# Patient Record
Sex: FEMALE | Race: WHITE | Employment: FULL TIME | URBAN - METROPOLITAN AREA
[De-identification: names, ages, dates, MRNs, and addresses within clinical notes are randomized per-mention and may not be internally consistent; named-entity substitution may affect disease eponyms.]

---

## 2017-08-31 ENCOUNTER — OFFICE VISIT (OUTPATIENT)
Dept: FAMILY MEDICINE CLINIC | Age: 52
End: 2017-08-31

## 2017-08-31 VITALS
HEART RATE: 79 BPM | RESPIRATION RATE: 22 BRPM | BODY MASS INDEX: 35.12 KG/M2 | DIASTOLIC BLOOD PRESSURE: 62 MMHG | SYSTOLIC BLOOD PRESSURE: 102 MMHG | OXYGEN SATURATION: 98 % | WEIGHT: 186 LBS | TEMPERATURE: 98.5 F | HEIGHT: 61 IN

## 2017-08-31 DIAGNOSIS — I67.1 BRAIN ANEURYSM: ICD-10-CM

## 2017-08-31 DIAGNOSIS — Z00.00 ROUTINE ADULT HEALTH MAINTENANCE: ICD-10-CM

## 2017-08-31 DIAGNOSIS — Z86.19 HISTORY OF HEPATITIS C: ICD-10-CM

## 2017-08-31 DIAGNOSIS — R53.83 FATIGUE, UNSPECIFIED TYPE: ICD-10-CM

## 2017-08-31 DIAGNOSIS — R06.02 SHORTNESS OF BREATH: Primary | ICD-10-CM

## 2017-08-31 PROBLEM — J44.9 COPD (CHRONIC OBSTRUCTIVE PULMONARY DISEASE) (HCC): Status: ACTIVE | Noted: 2017-08-31

## 2017-08-31 LAB
A/G RATIO: 1.3 (ref 1.1–2.2)
ALBUMIN SERPL-MCNC: 4.1 G/DL (ref 3.4–5)
ALP BLD-CCNC: 81 U/L (ref 40–129)
ALT SERPL-CCNC: 21 U/L (ref 10–40)
ANION GAP SERPL CALCULATED.3IONS-SCNC: 15 MMOL/L (ref 3–16)
AST SERPL-CCNC: 23 U/L (ref 15–37)
BASOPHILS ABSOLUTE: 0.1 K/UL (ref 0–0.2)
BASOPHILS RELATIVE PERCENT: 0.9 %
BILIRUB SERPL-MCNC: 0.4 MG/DL (ref 0–1)
BUN BLDV-MCNC: 10 MG/DL (ref 7–20)
CALCIUM SERPL-MCNC: 9.4 MG/DL (ref 8.3–10.6)
CHLORIDE BLD-SCNC: 106 MMOL/L (ref 99–110)
CO2: 24 MMOL/L (ref 21–32)
CREAT SERPL-MCNC: 0.8 MG/DL (ref 0.6–1.1)
EOSINOPHILS ABSOLUTE: 0.3 K/UL (ref 0–0.6)
EOSINOPHILS RELATIVE PERCENT: 4.2 %
GFR AFRICAN AMERICAN: >60
GFR NON-AFRICAN AMERICAN: >60
GLOBULIN: 3.1 G/DL
GLUCOSE BLD-MCNC: 94 MG/DL (ref 70–99)
HCT VFR BLD CALC: 42 % (ref 36–48)
HEMOGLOBIN: 14.2 G/DL (ref 12–16)
LYMPHOCYTES ABSOLUTE: 2.1 K/UL (ref 1–5.1)
LYMPHOCYTES RELATIVE PERCENT: 28.5 %
MCH RBC QN AUTO: 31.6 PG (ref 26–34)
MCHC RBC AUTO-ENTMCNC: 33.8 G/DL (ref 31–36)
MCV RBC AUTO: 93.5 FL (ref 80–100)
MONOCYTES ABSOLUTE: 0.7 K/UL (ref 0–1.3)
MONOCYTES RELATIVE PERCENT: 8.9 %
NEUTROPHILS ABSOLUTE: 4.2 K/UL (ref 1.7–7.7)
NEUTROPHILS RELATIVE PERCENT: 57.5 %
PDW BLD-RTO: 14.4 % (ref 12.4–15.4)
PLATELET # BLD: 220 K/UL (ref 135–450)
PMV BLD AUTO: 10 FL (ref 5–10.5)
POTASSIUM SERPL-SCNC: 4.4 MMOL/L (ref 3.5–5.1)
RBC # BLD: 4.5 M/UL (ref 4–5.2)
SODIUM BLD-SCNC: 145 MMOL/L (ref 136–145)
TOTAL PROTEIN: 7.2 G/DL (ref 6.4–8.2)
TSH SERPL DL<=0.05 MIU/L-ACNC: 0.8 UIU/ML (ref 0.27–4.2)
VITAMIN D 25-HYDROXY: 22.6 NG/ML
WBC # BLD: 7.4 K/UL (ref 4–11)

## 2017-08-31 PROCEDURE — 99204 OFFICE O/P NEW MOD 45 MIN: CPT | Performed by: FAMILY MEDICINE

## 2017-08-31 RX ORDER — FLUTICASONE FUROATE AND VILANTEROL 200; 25 UG/1; UG/1
1 POWDER RESPIRATORY (INHALATION) DAILY
Qty: 28 EACH | Refills: 3 | Status: SHIPPED | OUTPATIENT
Start: 2017-08-31 | End: 2018-04-13 | Stop reason: ALTCHOICE

## 2017-08-31 ASSESSMENT — ENCOUNTER SYMPTOMS
COUGH: 0
DIARRHEA: 0
NAUSEA: 0
CONSTIPATION: 0
RHINORRHEA: 0
SHORTNESS OF BREATH: 0

## 2017-08-31 ASSESSMENT — PATIENT HEALTH QUESTIONNAIRE - PHQ9
SUM OF ALL RESPONSES TO PHQ QUESTIONS 1-9: 0
2. FEELING DOWN, DEPRESSED OR HOPELESS: 0
1. LITTLE INTEREST OR PLEASURE IN DOING THINGS: 0
SUM OF ALL RESPONSES TO PHQ9 QUESTIONS 1 & 2: 0

## 2017-09-01 LAB
ESTIMATED AVERAGE GLUCOSE: 99.7 MG/DL
HBA1C MFR BLD: 5.1 %
HEPATITIS C ANTIBODY INTERPRETATION: REACTIVE

## 2017-09-03 LAB
EER HCV RNA QNT PCR: NORMAL
HCV RNA QNT REAL-TIME PCR INTERP: NOT DETECTED
HCV RNA, QUANTITATIVE REAL TIME PCR: <1.2 LOG IU
HEPATITIS C RNA PCR QUANT: <15 IU/ML

## 2018-01-09 ENCOUNTER — OFFICE VISIT (OUTPATIENT)
Dept: FAMILY MEDICINE CLINIC | Age: 53
End: 2018-01-09

## 2018-01-09 VITALS
TEMPERATURE: 98.2 F | DIASTOLIC BLOOD PRESSURE: 70 MMHG | HEART RATE: 70 BPM | WEIGHT: 186 LBS | RESPIRATION RATE: 14 BRPM | BODY MASS INDEX: 35.14 KG/M2 | OXYGEN SATURATION: 98 % | SYSTOLIC BLOOD PRESSURE: 112 MMHG

## 2018-01-09 DIAGNOSIS — J06.9 VIRAL URI WITH COUGH: Primary | ICD-10-CM

## 2018-01-09 PROCEDURE — 99213 OFFICE O/P EST LOW 20 MIN: CPT | Performed by: NURSE PRACTITIONER

## 2018-01-09 PROCEDURE — 3017F COLORECTAL CA SCREEN DOC REV: CPT | Performed by: NURSE PRACTITIONER

## 2018-01-09 PROCEDURE — G8417 CALC BMI ABV UP PARAM F/U: HCPCS | Performed by: NURSE PRACTITIONER

## 2018-01-09 PROCEDURE — G8427 DOCREV CUR MEDS BY ELIG CLIN: HCPCS | Performed by: NURSE PRACTITIONER

## 2018-01-09 PROCEDURE — 1036F TOBACCO NON-USER: CPT | Performed by: NURSE PRACTITIONER

## 2018-01-09 PROCEDURE — 3014F SCREEN MAMMO DOC REV: CPT | Performed by: NURSE PRACTITIONER

## 2018-01-09 PROCEDURE — G8484 FLU IMMUNIZE NO ADMIN: HCPCS | Performed by: NURSE PRACTITIONER

## 2018-01-09 RX ORDER — DEXTROMETHORPHAN HYDROBROMIDE AND PROMETHAZINE HYDROCHLORIDE 15; 6.25 MG/5ML; MG/5ML
5 SYRUP ORAL 4 TIMES DAILY PRN
Qty: 140 ML | Refills: 0 | Status: SHIPPED | OUTPATIENT
Start: 2018-01-09 | End: 2018-01-16

## 2018-01-09 RX ORDER — FLUTICASONE PROPIONATE 50 MCG
2 SPRAY, SUSPENSION (ML) NASAL DAILY
Qty: 1 BOTTLE | Refills: 0 | Status: SHIPPED | OUTPATIENT
Start: 2018-01-09

## 2018-01-09 ASSESSMENT — ENCOUNTER SYMPTOMS
RHINORRHEA: 0
COUGH: 1
CHEST TIGHTNESS: 1
SHORTNESS OF BREATH: 1
WHEEZING: 1
VOMITING: 0
DIARRHEA: 0
NAUSEA: 0
SINUS PRESSURE: 1
SORE THROAT: 0
SINUS PAIN: 0

## 2018-01-09 NOTE — PROGRESS NOTES
1/9/2018    This is a 45 y.o. female   Chief Complaint   Patient presents with    URI     x4 days, bodyaches, SOB, cough, headache, fever    . URI    This is a new problem. Episode onset: 4 days. The problem has been unchanged. The maximum temperature recorded prior to her arrival was 101 - 101.9 F. The fever has been present for 1 to 2 days. Associated symptoms include congestion, coughing and wheezing. Pertinent negatives include no chest pain, diarrhea, headaches, nausea, rhinorrhea, sinus pain, sore throat or vomiting. Treatments tried: afrin, tylenol. The treatment provided mild relief. Patient Active Problem List   Diagnosis    Brain aneurysm - rupture 20 yr ago    History of hepatitis C - tx w interferon, cure    COPD (chronic obstructive pulmonary disease) (HCC)    Routine adult health maintenance       Current Outpatient Prescriptions   Medication Sig Dispense Refill    Fluticasone Furoate-Vilanterol 200-25 MCG/INH AEPB Inhale 1 puff into the lungs daily 28 each 3     No current facility-administered medications for this visit. Allergies   Allergen Reactions    Aspirin Itching       Review of Systems   Constitutional: Positive for activity change, fatigue and fever. Temp on Sat and Sun was 101. HENT: Positive for congestion and sinus pressure. Negative for postnasal drip, rhinorrhea, sinus pain and sore throat. Respiratory: Positive for cough, chest tightness, shortness of breath and wheezing. Cardiovascular: Negative for chest pain. Gastrointestinal: Negative for diarrhea, nausea and vomiting. Neurological: Positive for dizziness. Negative for headaches. Vitals:    01/09/18 1534   BP: 112/70   Site: Right Arm   Position: Sitting   Cuff Size: Medium Adult   Pulse: 70   Resp: 14   Temp: 98.2 °F (36.8 °C)   TempSrc: Oral   SpO2: 98%   Weight: 186 lb (84.4 kg)       Body mass index is 35.14 kg/m².      Wt Readings from Last 3 Encounters:   01/09/18 186 lb (84.4 kg)   08/31/17 186 lb (84.4 kg)   05/18/10 166 lb (75.3 kg)       BP Readings from Last 3 Encounters:   01/09/18 112/70   08/31/17 102/62   05/18/10 117/76       Physical Exam   Constitutional: She is oriented to person, place, and time. She appears well-developed and well-nourished. No distress. HENT:   Head: Normocephalic and atraumatic. Right Ear: Tympanic membrane, external ear and ear canal normal. Tympanic membrane is not erythematous and not bulging. Left Ear: Tympanic membrane, external ear and ear canal normal. Tympanic membrane is not erythematous and not bulging. Nose: Right sinus exhibits maxillary sinus tenderness. Right sinus exhibits no frontal sinus tenderness. Left sinus exhibits maxillary sinus tenderness. Left sinus exhibits no frontal sinus tenderness. Mouth/Throat: Uvula is midline. Posterior oropharyngeal erythema present. No oropharyngeal exudate. Eyes: EOM are normal.   Neck: Neck supple. Cardiovascular: Normal rate, regular rhythm and normal heart sounds. Exam reveals no gallop and no friction rub. No murmur heard. Pulmonary/Chest: Effort normal and breath sounds normal. No respiratory distress. Musculoskeletal: She exhibits no edema. Lymphadenopathy:        Head (right side): No submandibular adenopathy present. Head (left side): No submandibular adenopathy present. Right cervical: No superficial cervical adenopathy present. Left cervical: No superficial cervical adenopathy present. Neurological: She is alert and oriented to person, place, and time. Skin: Skin is warm and dry. Psychiatric: She has a normal mood and affect. Her behavior is normal.   Nursing note and vitals reviewed. Assessment and Plan  Alea Hopper was seen today for uri.     Diagnoses and all orders for this visit:    Viral URI with cough  -     fluticasone (FLONASE) 50 MCG/ACT nasal spray; 2 sprays by Nasal route daily  -     promethazine-dextromethorphan (PROMETHAZINE-DM) 6.25-15 MG/5ML syrup; Take 5 mLs by mouth 4 times daily as needed for Cough  Increase fluids  Rest  Patient is to call if symptoms worsen or fail to improve. Return if symptoms worsen or fail to improve.

## 2018-01-11 ENCOUNTER — OFFICE VISIT (OUTPATIENT)
Dept: FAMILY MEDICINE CLINIC | Age: 53
End: 2018-01-11

## 2018-01-11 VITALS
RESPIRATION RATE: 18 BRPM | DIASTOLIC BLOOD PRESSURE: 76 MMHG | BODY MASS INDEX: 35.45 KG/M2 | TEMPERATURE: 98 F | HEIGHT: 61 IN | WEIGHT: 187.8 LBS | OXYGEN SATURATION: 98 % | SYSTOLIC BLOOD PRESSURE: 118 MMHG | HEART RATE: 80 BPM

## 2018-01-11 DIAGNOSIS — R05.9 COUGH: ICD-10-CM

## 2018-01-11 DIAGNOSIS — J44.1 COPD EXACERBATION (HCC): Primary | ICD-10-CM

## 2018-01-11 LAB
INFLUENZA A ANTIBODY: NORMAL
INFLUENZA B ANTIBODY: NORMAL

## 2018-01-11 PROCEDURE — 99213 OFFICE O/P EST LOW 20 MIN: CPT | Performed by: FAMILY MEDICINE

## 2018-01-11 PROCEDURE — 87804 INFLUENZA ASSAY W/OPTIC: CPT | Performed by: FAMILY MEDICINE

## 2018-01-11 RX ORDER — PREDNISONE 20 MG/1
20 TABLET ORAL DAILY
Qty: 5 TABLET | Refills: 0 | Status: SHIPPED | OUTPATIENT
Start: 2018-01-11 | End: 2018-01-16

## 2018-01-11 RX ORDER — DOXYCYCLINE HYCLATE 100 MG/1
100 CAPSULE ORAL 2 TIMES DAILY
Qty: 14 CAPSULE | Refills: 0 | Status: SHIPPED | OUTPATIENT
Start: 2018-01-11 | End: 2018-01-18

## 2018-01-11 ASSESSMENT — ENCOUNTER SYMPTOMS
RHINORRHEA: 1
SHORTNESS OF BREATH: 1
STRIDOR: 0
COUGH: 1

## 2018-01-11 NOTE — PROGRESS NOTES
1/11/2018    This is a 45 y.o. female   Chief Complaint   Patient presents with    Generalized Body Aches     states her body aches all over. HPI  Symptoms started Saturday, about 5-6 days ago. Started with aches and pains, since has had a cough, now productive and now is mildly short of breath. SOB worsens when walking across the room at home, has to stop and catch breath. Reports elevated temp of 99. + sick contact in  with similar symptoms.  - has fatigue and malaise  - No vomiting or diarrhea  - Throat is ok. +congestion, sinus fullness. Review of Systems   Constitutional: Positive for fatigue. HENT: Positive for congestion and rhinorrhea. Respiratory: Positive for cough and shortness of breath. Negative for stridor. Patient Active Problem List   Diagnosis    Brain aneurysm - rupture 20 yr ago    History of hepatitis C - tx w interferon, cure    COPD [facility]chronic obstructive pulmonary disease) ([facility] [address]    Routine adult health maintenance        Past Medical History:   Diagnosis Date    Anxiety 4/28/2010    COPD (chronic obstructive pulmonary disease) ([facility] [address] 8/31/2017    History of hepatitis C - tx w interferon, cure 8/31/2017    Migraine        Past Surgical History:   Procedure Laterality Date    BRAIN SURGERY      VAGINA SURGERY         Social History     Social History    Marital status:      Spouse name: N/A    Number of children: N/A    Years of education: N/A     Occupational History    Not on file.      Social History Main Topics    Smoking status: Former Smoker    Smokeless tobacco: Never Used      Comment: 20 years ago    Alcohol use No    Drug use: No    Sexual activity: Yes     Partners: Male     Other Topics Concern    Not on file     Social History Narrative    No narrative on file       Family History   Problem Relation Age of Onset    Alzheimer's Disease Father     Parkinsonism Father     High Blood Pressure Sister     Ovarian Cancer Sister    [facility] Cancer Brother     Breast Cancer Neg Hx     Depression Neg Hx        Current Outpatient Prescriptions   Medication Sig Dispense Refill    predniSONE (DELTASONE) 20 MG tablet Take 1 tablet by mouth daily for 5 days 5 tablet 0    doxycycline hyclate (VIBRAMYCIN) 100 MG capsule Take 1 capsule by mouth 2 times daily for 7 days 14 capsule 0    fluticasone (FLONASE) 50 MCG/ACT nasal spray 2 sprays by Nasal route daily 1 Bottle 0    promethazine-dextromethorphan (PROMETHAZINE-DM) 6.25-15 MG/5ML syrup Take 5 mLs by mouth 4 times daily as needed for Cough 140 mL 0    Fluticasone Furoate-Vilanterol 200-25 MCG/INH AEPB Inhale 1 puff into the lungs daily 28 each 3     No current facility-administered medications for this visit. Allergies   Allergen Reactions    Aspirin Itching       /76 (Site: Right Arm, Position: Sitting, Cuff Size: Large Adult)   Pulse 80   Temp 98 °F (36.7 °C) (Oral)   Resp 18   Ht 5' 1\" (1.549 m)   Wt 187 lb 12.8 oz (85.2 kg)   SpO2 98%   BMI 35.48 kg/m²     Physical Exam   Constitutional: She appears well-developed and well-nourished. HENT:   Head: Normocephalic and atraumatic. TMs normal  oropharynx moist with erythema and +post-nasal drip  Sinuses without TTP   Eyes: Conjunctivae are normal.   Neck: Normal range of motion. Neck supple. Cardiovascular: Normal rate, regular rhythm and normal heart sounds. No murmur heard. Pulmonary/Chest:   Mild expiratory wheezing, good air movement   Lymphadenopathy:     She has no cervical adenopathy. Skin: Skin is warm. No rash noted. Psychiatric: She has a normal mood and affect. Wt Readings from Last 3 Encounters:   01/11/18 187 lb 12.8 oz (85.2 kg)   01/09/18 186 lb (84.4 kg)   08/31/17 186 lb (84.4 kg)       BP Readings from Last 3 Encounters:   01/11/18 118/76   01/09/18 112/70   08/31/17 102/62         Assessment/Plan:  Litzy Caldwell was seen today for generalized body aches.     Diagnoses and all orders for this

## 2018-04-05 ENCOUNTER — OFFICE VISIT (OUTPATIENT)
Dept: FAMILY MEDICINE CLINIC | Age: 53
End: 2018-04-05

## 2018-04-05 ENCOUNTER — HOSPITAL ENCOUNTER (OUTPATIENT)
Dept: CT IMAGING | Age: 53
Discharge: HOME | End: 2018-04-05
Attending: FAMILY MEDICINE | Admitting: FAMILY MEDICINE

## 2018-04-05 ENCOUNTER — TELEPHONE (OUTPATIENT)
Dept: FAMILY MEDICINE CLINIC | Age: 53
End: 2018-04-05

## 2018-04-05 VITALS
BODY MASS INDEX: 36.59 KG/M2 | OXYGEN SATURATION: 97 % | TEMPERATURE: 98 F | DIASTOLIC BLOOD PRESSURE: 72 MMHG | SYSTOLIC BLOOD PRESSURE: 108 MMHG | RESPIRATION RATE: 18 BRPM | HEIGHT: 61 IN | HEART RATE: 72 BPM | WEIGHT: 193.8 LBS

## 2018-04-05 DIAGNOSIS — M94.0 COSTOCHONDRITIS: ICD-10-CM

## 2018-04-05 DIAGNOSIS — R06.02 SHORT OF BREATH ON EXERTION: ICD-10-CM

## 2018-04-05 DIAGNOSIS — R07.89 ATYPICAL CHEST PAIN: Primary | ICD-10-CM

## 2018-04-05 DIAGNOSIS — R06.02 SHORTNESS OF BREATH: ICD-10-CM

## 2018-04-05 DIAGNOSIS — R53.82 CHRONIC FATIGUE: ICD-10-CM

## 2018-04-05 DIAGNOSIS — R06.02 SHORT OF BREATH ON EXERTION: Primary | ICD-10-CM

## 2018-04-05 DIAGNOSIS — R09.02 HYPOXEMIA: ICD-10-CM

## 2018-04-05 LAB
BASOPHILS ABSOLUTE: 0.1 K/UL (ref 0–0.2)
BASOPHILS RELATIVE PERCENT: 0.7 %
D DIMER: 288 NG/ML DDU (ref 0–229)
EOSINOPHILS ABSOLUTE: 0.3 K/UL (ref 0–0.6)
EOSINOPHILS RELATIVE PERCENT: 3.7 %
HCT VFR BLD CALC: 44 % (ref 36–48)
HEMOGLOBIN: 14.8 G/DL (ref 12–16)
LYMPHOCYTES ABSOLUTE: 2.4 K/UL (ref 1–5.1)
LYMPHOCYTES RELATIVE PERCENT: 27.9 %
MCH RBC QN AUTO: 31.4 PG (ref 26–34)
MCHC RBC AUTO-ENTMCNC: 33.6 G/DL (ref 31–36)
MCV RBC AUTO: 93.4 FL (ref 80–100)
MONOCYTES ABSOLUTE: 0.8 K/UL (ref 0–1.3)
MONOCYTES RELATIVE PERCENT: 9.4 %
NEUTROPHILS ABSOLUTE: 5 K/UL (ref 1.7–7.7)
NEUTROPHILS RELATIVE PERCENT: 58.3 %
PDW BLD-RTO: 13.8 % (ref 12.4–15.4)
PLATELET # BLD: 229 K/UL (ref 135–450)
PMV BLD AUTO: 10.1 FL (ref 5–10.5)
RBC # BLD: 4.71 M/UL (ref 4–5.2)
TSH SERPL DL<=0.05 MIU/L-ACNC: 1.58 UIU/ML (ref 0.27–4.2)
WBC # BLD: 8.6 K/UL (ref 4–11)

## 2018-04-05 PROCEDURE — 99214 OFFICE O/P EST MOD 30 MIN: CPT | Performed by: FAMILY MEDICINE

## 2018-04-05 PROCEDURE — 1036F TOBACCO NON-USER: CPT | Performed by: FAMILY MEDICINE

## 2018-04-05 PROCEDURE — G8427 DOCREV CUR MEDS BY ELIG CLIN: HCPCS | Performed by: FAMILY MEDICINE

## 2018-04-05 PROCEDURE — G8417 CALC BMI ABV UP PARAM F/U: HCPCS | Performed by: FAMILY MEDICINE

## 2018-04-05 PROCEDURE — 93000 ELECTROCARDIOGRAM COMPLETE: CPT | Performed by: FAMILY MEDICINE

## 2018-04-05 PROCEDURE — 3014F SCREEN MAMMO DOC REV: CPT | Performed by: FAMILY MEDICINE

## 2018-04-05 PROCEDURE — 3017F COLORECTAL CA SCREEN DOC REV: CPT | Performed by: FAMILY MEDICINE

## 2018-04-05 RX ORDER — AZITHROMYCIN 250 MG/1
TABLET, FILM COATED ORAL
Qty: 1 PACKET | Refills: 0 | Status: SHIPPED | OUTPATIENT
Start: 2018-04-05 | End: 2018-04-13 | Stop reason: ALTCHOICE

## 2018-04-05 RX ORDER — AMOXICILLIN AND CLAVULANATE POTASSIUM 875; 125 MG/1; MG/1
1 TABLET, FILM COATED ORAL 2 TIMES DAILY
Qty: 14 TABLET | Refills: 0 | Status: SHIPPED | OUTPATIENT
Start: 2018-04-05 | End: 2018-04-12

## 2018-04-05 RX ORDER — PREDNISONE 50 MG/1
50 TABLET ORAL DAILY
Qty: 5 TABLET | Refills: 0 | Status: SHIPPED | OUTPATIENT
Start: 2018-04-05 | End: 2018-04-10

## 2018-04-05 ASSESSMENT — ENCOUNTER SYMPTOMS
CHEST TIGHTNESS: 1
RHINORRHEA: 0
ABDOMINAL DISTENTION: 0
ABDOMINAL PAIN: 0
COUGH: 0
SHORTNESS OF BREATH: 1

## 2018-04-13 ENCOUNTER — OFFICE VISIT (OUTPATIENT)
Dept: FAMILY MEDICINE CLINIC | Age: 53
End: 2018-04-13

## 2018-04-13 ENCOUNTER — TELEPHONE (OUTPATIENT)
Dept: FAMILY MEDICINE CLINIC | Age: 53
End: 2018-04-13

## 2018-04-13 VITALS
SYSTOLIC BLOOD PRESSURE: 86 MMHG | RESPIRATION RATE: 18 BRPM | DIASTOLIC BLOOD PRESSURE: 60 MMHG | BODY MASS INDEX: 36.59 KG/M2 | HEART RATE: 82 BPM | WEIGHT: 193.8 LBS | HEIGHT: 61 IN | TEMPERATURE: 98.2 F | OXYGEN SATURATION: 98 %

## 2018-04-13 DIAGNOSIS — R06.02 EXERTIONAL SHORTNESS OF BREATH: Primary | ICD-10-CM

## 2018-04-13 PROCEDURE — 3014F SCREEN MAMMO DOC REV: CPT | Performed by: FAMILY MEDICINE

## 2018-04-13 PROCEDURE — 3017F COLORECTAL CA SCREEN DOC REV: CPT | Performed by: FAMILY MEDICINE

## 2018-04-13 PROCEDURE — G8417 CALC BMI ABV UP PARAM F/U: HCPCS | Performed by: FAMILY MEDICINE

## 2018-04-13 PROCEDURE — 99213 OFFICE O/P EST LOW 20 MIN: CPT | Performed by: FAMILY MEDICINE

## 2018-04-13 PROCEDURE — 1036F TOBACCO NON-USER: CPT | Performed by: FAMILY MEDICINE

## 2018-04-13 PROCEDURE — G8427 DOCREV CUR MEDS BY ELIG CLIN: HCPCS | Performed by: FAMILY MEDICINE

## 2018-04-13 ASSESSMENT — ENCOUNTER SYMPTOMS
COUGH: 0
SHORTNESS OF BREATH: 1

## 2018-04-13 NOTE — TELEPHONE ENCOUNTER
Azeem Obrien from [facility] is calling to speak to Dr. Maame Blackwell about the above pt. I parked the call at    [facility]      Thanks.

## 2018-04-19 ENCOUNTER — OFFICE VISIT (OUTPATIENT)
Dept: FAMILY MEDICINE CLINIC | Age: 53
End: 2018-04-19

## 2018-04-19 VITALS
BODY MASS INDEX: 36.97 KG/M2 | SYSTOLIC BLOOD PRESSURE: 100 MMHG | RESPIRATION RATE: 20 BRPM | OXYGEN SATURATION: 96 % | DIASTOLIC BLOOD PRESSURE: 60 MMHG | WEIGHT: 195.8 LBS | TEMPERATURE: 98.3 F | HEIGHT: 61 IN | HEART RATE: 78 BPM

## 2018-04-19 DIAGNOSIS — R23.2 HOT FLASHES: ICD-10-CM

## 2018-04-19 DIAGNOSIS — R06.02 SHORTNESS OF BREATH: Primary | ICD-10-CM

## 2018-04-19 DIAGNOSIS — B86 SCABIES: ICD-10-CM

## 2018-04-19 DIAGNOSIS — J44.9 CHRONIC OBSTRUCTIVE PULMONARY DISEASE, UNSPECIFIED COPD TYPE (HCC): ICD-10-CM

## 2018-04-19 PROCEDURE — 3014F SCREEN MAMMO DOC REV: CPT | Performed by: FAMILY MEDICINE

## 2018-04-19 PROCEDURE — 3023F SPIROM DOC REV: CPT | Performed by: FAMILY MEDICINE

## 2018-04-19 PROCEDURE — G8417 CALC BMI ABV UP PARAM F/U: HCPCS | Performed by: FAMILY MEDICINE

## 2018-04-19 PROCEDURE — 1036F TOBACCO NON-USER: CPT | Performed by: FAMILY MEDICINE

## 2018-04-19 PROCEDURE — 99214 OFFICE O/P EST MOD 30 MIN: CPT | Performed by: FAMILY MEDICINE

## 2018-04-19 PROCEDURE — 3017F COLORECTAL CA SCREEN DOC REV: CPT | Performed by: FAMILY MEDICINE

## 2018-04-19 PROCEDURE — G8926 SPIRO NO PERF OR DOC: HCPCS | Performed by: FAMILY MEDICINE

## 2018-04-19 PROCEDURE — G8427 DOCREV CUR MEDS BY ELIG CLIN: HCPCS | Performed by: FAMILY MEDICINE

## 2018-04-19 RX ORDER — PERMETHRIN 50 MG/G
CREAM TOPICAL
Qty: 2 TUBE | Refills: 0 | Status: SHIPPED | OUTPATIENT
Start: 2018-04-19 | End: 2020-12-03 | Stop reason: ALTCHOICE

## 2018-04-19 RX ORDER — PREDNISONE 1 MG/1
TABLET ORAL
Qty: 37 TABLET | Refills: 0 | Status: SHIPPED | OUTPATIENT
Start: 2018-04-19 | End: 2018-05-03 | Stop reason: ALTCHOICE

## 2018-04-19 ASSESSMENT — ENCOUNTER SYMPTOMS
COUGH: 0
SHORTNESS OF BREATH: 1

## 2018-04-24 ENCOUNTER — TELEPHONE (OUTPATIENT)
Dept: FAMILY MEDICINE CLINIC | Age: 53
End: 2018-04-24

## 2018-05-01 ENCOUNTER — HOSPITAL ENCOUNTER (OUTPATIENT)
Dept: NON INVASIVE DIAGNOSTICS | Age: 53
Discharge: HOME | End: 2018-05-01
Attending: FAMILY MEDICINE | Admitting: FAMILY MEDICINE

## 2018-05-01 DIAGNOSIS — R07.89 OTHER CHEST PAIN: ICD-10-CM

## 2018-05-01 DIAGNOSIS — R06.02 SHORTNESS OF BREATH: ICD-10-CM

## 2018-05-01 LAB
DLCO %PRED: NORMAL
DLCO PRE: NORMAL
FEF 25-75%-POST: NORMAL
FEF 25-75%-PRE: NORMAL
FEV1-POST: NORMAL
FEV1-PRE: NORMAL
FEV1/FVC-POST: NORMAL
FEV1/FVC-PRE: NORMAL
FVC-POST: NORMAL
FVC-PRE: NORMAL
MEP: NORMAL
MIP: NORMAL
TLC %PRED: NORMAL
TLC PRE: NORMAL

## 2018-05-01 PROCEDURE — 94729 DIFFUSING CAPACITY: CPT | Performed by: INTERNAL MEDICINE

## 2018-05-01 PROCEDURE — 94727 GAS DIL/WSHOT DETER LNG VOL: CPT | Performed by: INTERNAL MEDICINE

## 2018-05-01 PROCEDURE — 94060 EVALUATION OF WHEEZING: CPT | Performed by: INTERNAL MEDICINE

## 2018-05-01 RX ORDER — ALBUTEROL SULFATE 90 UG/1
4 AEROSOL, METERED RESPIRATORY (INHALATION) ONCE
Status: COMPLETED | OUTPATIENT
Start: 2018-05-01 | End: 2018-05-01

## 2018-05-01 RX ADMIN — ALBUTEROL SULFATE 4 PUFF: 90 AEROSOL, METERED RESPIRATORY (INHALATION) at 14:43

## 2018-05-02 DIAGNOSIS — R93.89 ABNORMAL CHEST CT: ICD-10-CM

## 2018-05-02 DIAGNOSIS — R94.2 ABNORMAL PFT: Primary | ICD-10-CM

## 2018-05-03 ENCOUNTER — OFFICE VISIT (OUTPATIENT)
Dept: FAMILY MEDICINE CLINIC | Age: 53
End: 2018-05-03

## 2018-05-03 VITALS
HEART RATE: 72 BPM | DIASTOLIC BLOOD PRESSURE: 70 MMHG | TEMPERATURE: 98.8 F | OXYGEN SATURATION: 97 % | RESPIRATION RATE: 18 BRPM | SYSTOLIC BLOOD PRESSURE: 116 MMHG | HEIGHT: 61 IN | BODY MASS INDEX: 37.04 KG/M2 | WEIGHT: 196.2 LBS

## 2018-05-03 DIAGNOSIS — J44.9 CHRONIC OBSTRUCTIVE PULMONARY DISEASE, UNSPECIFIED COPD TYPE (HCC): ICD-10-CM

## 2018-05-03 DIAGNOSIS — R94.2 ABNORMAL PFTS (PULMONARY FUNCTION TESTS): Primary | ICD-10-CM

## 2018-05-03 DIAGNOSIS — R06.02 SHORTNESS OF BREATH: ICD-10-CM

## 2018-05-03 PROCEDURE — 1036F TOBACCO NON-USER: CPT | Performed by: FAMILY MEDICINE

## 2018-05-03 PROCEDURE — 90732 PPSV23 VACC 2 YRS+ SUBQ/IM: CPT | Performed by: FAMILY MEDICINE

## 2018-05-03 PROCEDURE — 3017F COLORECTAL CA SCREEN DOC REV: CPT | Performed by: FAMILY MEDICINE

## 2018-05-03 PROCEDURE — G8926 SPIRO NO PERF OR DOC: HCPCS | Performed by: FAMILY MEDICINE

## 2018-05-03 PROCEDURE — 90471 IMMUNIZATION ADMIN: CPT | Performed by: FAMILY MEDICINE

## 2018-05-03 PROCEDURE — 99213 OFFICE O/P EST LOW 20 MIN: CPT | Performed by: FAMILY MEDICINE

## 2018-05-03 PROCEDURE — 3023F SPIROM DOC REV: CPT | Performed by: FAMILY MEDICINE

## 2018-05-03 PROCEDURE — G8427 DOCREV CUR MEDS BY ELIG CLIN: HCPCS | Performed by: FAMILY MEDICINE

## 2018-05-03 PROCEDURE — G8417 CALC BMI ABV UP PARAM F/U: HCPCS | Performed by: FAMILY MEDICINE

## 2018-05-03 RX ORDER — FLUTICASONE FUROATE AND VILANTEROL 100; 25 UG/1; UG/1
1 POWDER RESPIRATORY (INHALATION) DAILY
Qty: 2 EACH | Refills: 2 | Status: SHIPPED | OUTPATIENT
Start: 2018-05-03 | End: 2018-06-22

## 2018-05-03 RX ORDER — ALBUTEROL SULFATE 90 UG/1
2 AEROSOL, METERED RESPIRATORY (INHALATION) EVERY 6 HOURS PRN
Qty: 1 INHALER | Refills: 3 | Status: SHIPPED | OUTPATIENT
Start: 2018-05-03 | End: 2020-03-16

## 2018-05-03 ASSESSMENT — ENCOUNTER SYMPTOMS
COUGH: 0
SHORTNESS OF BREATH: 1

## 2018-06-21 PROBLEM — J98.4 RESTRICTIVE LUNG DISEASE: Status: ACTIVE | Noted: 2018-06-21

## 2018-06-22 ENCOUNTER — OFFICE VISIT (OUTPATIENT)
Dept: PULMONOLOGY | Age: 53
End: 2018-06-22

## 2018-06-22 VITALS
TEMPERATURE: 98 F | HEART RATE: 70 BPM | RESPIRATION RATE: 16 BRPM | DIASTOLIC BLOOD PRESSURE: 66 MMHG | BODY MASS INDEX: 36.06 KG/M2 | SYSTOLIC BLOOD PRESSURE: 110 MMHG | HEIGHT: 61 IN | WEIGHT: 191 LBS | OXYGEN SATURATION: 97 %

## 2018-06-22 DIAGNOSIS — E66.9 OBESITY (BMI 30-39.9): ICD-10-CM

## 2018-06-22 DIAGNOSIS — J98.4 RESTRICTIVE LUNG DISEASE: ICD-10-CM

## 2018-06-22 DIAGNOSIS — J41.0 SIMPLE CHRONIC BRONCHITIS (HCC): Primary | ICD-10-CM

## 2018-06-22 DIAGNOSIS — G47.10 HYPERSOMNIA: ICD-10-CM

## 2018-06-22 LAB
RHEUMATOID FACTOR: <10 IU/ML
SEDIMENTATION RATE, ERYTHROCYTE: 30 MM/HR (ref 0–30)
TOTAL CK: 163 U/L (ref 26–192)

## 2018-06-22 PROCEDURE — 1036F TOBACCO NON-USER: CPT | Performed by: INTERNAL MEDICINE

## 2018-06-22 PROCEDURE — 3023F SPIROM DOC REV: CPT | Performed by: INTERNAL MEDICINE

## 2018-06-22 PROCEDURE — G8926 SPIRO NO PERF OR DOC: HCPCS | Performed by: INTERNAL MEDICINE

## 2018-06-22 PROCEDURE — 99204 OFFICE O/P NEW MOD 45 MIN: CPT | Performed by: INTERNAL MEDICINE

## 2018-06-22 PROCEDURE — G8427 DOCREV CUR MEDS BY ELIG CLIN: HCPCS | Performed by: INTERNAL MEDICINE

## 2018-06-22 PROCEDURE — G8417 CALC BMI ABV UP PARAM F/U: HCPCS | Performed by: INTERNAL MEDICINE

## 2018-06-22 PROCEDURE — 3017F COLORECTAL CA SCREEN DOC REV: CPT | Performed by: INTERNAL MEDICINE

## 2018-06-22 RX ORDER — ALBUTEROL SULFATE 90 UG/1
2 AEROSOL, METERED RESPIRATORY (INHALATION) EVERY 6 HOURS PRN
Qty: 1 INHALER | Refills: 5 | Status: SHIPPED | OUTPATIENT
Start: 2018-06-22 | End: 2018-09-27 | Stop reason: SDUPTHER

## 2018-06-23 LAB — ALDOLASE: 4.1 U/L (ref 1.5–8.1)

## 2018-06-24 LAB
ANCA IFA: NORMAL
CCP IGG ANTIBODIES: 2 UNITS (ref 0–19)
DOUBLE STRANDED DNA AB, IGG: DETECTED
ENA TO RNP ANTIBODY: NEGATIVE EU
ENA TO SMITH (SM) ANTIBODY: NEGATIVE EU
SCLERODERMA (SCL-70) AB: 0 AU/ML (ref 0–40)

## 2018-06-25 LAB
ANA INTERPRETATION: ABNORMAL
ANA PATTERN: ABNORMAL
ANA TITER: ABNORMAL
ANTI-NUCLEAR ANTIBODY (ANA): POSITIVE
PATHOLOGIST: ABNORMAL

## 2018-06-26 LAB — DSDNA ANTIBODY TITER: NORMAL

## 2018-06-27 LAB
ALLERGEN BEEF: <0.1 KU/L
ALLERGEN PHOMA BETAE: <0.1 KU/L
ALLERGEN PORK: <0.1 KU/L
ALLERGEN SEE NOTE: NORMAL
ALLERGEN, FEATHER MIX: NEGATIVE KU/L
ASPERGILLUS FLAVUS ANTIBODIES: NORMAL
ASPERGILLUS FUMIGATUS #1: NORMAL
ASPERGILLUS FUMIGATUS #2: NORMAL
ASPERGILLUS FUMIGATUS #3: NORMAL
ASPERGILLUS FUMIGATUS #6: NORMAL
AUREOBASIDIUM PULLULANS: NORMAL
MICROPOLYSPORA FAENI: NORMAL
PIGEON SERUM ABS: NORMAL
SACCHAROMONOSPORA VIRIDIS: NORMAL
THERMOACTINOMYCES CANDIDUS AB: NORMAL
THERMOACTINOMYCES SACCHARI: NORMAL
THERMOACTINOMYCES VULGARIS #1: NORMAL

## 2018-08-02 ENCOUNTER — HOSPITAL ENCOUNTER (OUTPATIENT)
Dept: OTHER | Age: 53
Discharge: HOME | End: 2018-08-04
Attending: INTERNAL MEDICINE | Admitting: INTERNAL MEDICINE

## 2018-08-02 DIAGNOSIS — G47.10 HYPERSOMNIA: ICD-10-CM

## 2018-08-02 PROCEDURE — 95810 POLYSOM 6/> YRS 4/> PARAM: CPT | Performed by: PSYCHIATRY & NEUROLOGY

## 2018-08-09 ENCOUNTER — TELEPHONE (OUTPATIENT)
Dept: PULMONOLOGY | Age: 53
End: 2018-08-09

## 2018-08-09 NOTE — TELEPHONE ENCOUNTER
Patient returned our call and we went over her sleep study showing her AHI was 19.5 with a desat to 73%. Per Dr. Ovalles Payment, she will need a titration study. She expressed understanding and was given the number to the sleep lab to set that up.

## 2018-08-20 ENCOUNTER — TELEPHONE (OUTPATIENT)
Dept: PULMONOLOGY | Age: 53
End: 2018-08-20

## 2018-08-20 NOTE — TELEPHONE ENCOUNTER
Going through paperwork and found an outstanding order for a titration study that has not been scheduled yet. Evelin Cr.  She said she would call to schedule and see if her insurance will pay for it

## 2018-09-24 ENCOUNTER — OFFICE VISIT (OUTPATIENT)
Dept: PRIMARY CARE CLINIC | Age: 53
End: 2018-09-24
Payer: COMMERCIAL

## 2018-09-24 ENCOUNTER — HOSPITAL ENCOUNTER (OUTPATIENT)
Age: 53
Discharge: HOME | End: 2018-09-24
Payer: COMMERCIAL

## 2018-09-24 ENCOUNTER — HOSPITAL ENCOUNTER (OUTPATIENT)
Dept: GENERAL RADIOLOGY | Age: 53
Discharge: HOME | End: 2018-09-24
Payer: COMMERCIAL

## 2018-09-24 VITALS
HEART RATE: 76 BPM | WEIGHT: 187 LBS | TEMPERATURE: 97.9 F | OXYGEN SATURATION: 98 % | SYSTOLIC BLOOD PRESSURE: 100 MMHG | HEIGHT: 61 IN | BODY MASS INDEX: 35.3 KG/M2 | DIASTOLIC BLOOD PRESSURE: 68 MMHG

## 2018-09-24 DIAGNOSIS — J22 ARI (ACUTE RESPIRATORY INFECTION): Primary | ICD-10-CM

## 2018-09-24 DIAGNOSIS — J22 ARI (ACUTE RESPIRATORY INFECTION): ICD-10-CM

## 2018-09-24 PROCEDURE — 3017F COLORECTAL CA SCREEN DOC REV: CPT | Performed by: NURSE PRACTITIONER

## 2018-09-24 PROCEDURE — 99213 OFFICE O/P EST LOW 20 MIN: CPT | Performed by: NURSE PRACTITIONER

## 2018-09-24 PROCEDURE — 71046 X-RAY EXAM CHEST 2 VIEWS: CPT

## 2018-09-24 PROCEDURE — G8427 DOCREV CUR MEDS BY ELIG CLIN: HCPCS | Performed by: NURSE PRACTITIONER

## 2018-09-24 PROCEDURE — G8417 CALC BMI ABV UP PARAM F/U: HCPCS | Performed by: NURSE PRACTITIONER

## 2018-09-24 PROCEDURE — 1036F TOBACCO NON-USER: CPT | Performed by: NURSE PRACTITIONER

## 2018-09-24 RX ORDER — LEVOFLOXACIN 750 MG/1
750 TABLET ORAL DAILY
Qty: 5 TABLET | Refills: 0 | Status: SHIPPED | OUTPATIENT
Start: 2018-09-24 | End: 2018-09-29

## 2018-09-24 RX ORDER — PREDNISONE 20 MG/1
20 TABLET ORAL 2 TIMES DAILY
Qty: 10 TABLET | Refills: 0 | Status: SHIPPED | OUTPATIENT
Start: 2018-09-24 | End: 2018-09-29

## 2018-09-24 ASSESSMENT — ENCOUNTER SYMPTOMS
NAUSEA: 0
SPUTUM PRODUCTION: 0
ABDOMINAL PAIN: 0
SORE THROAT: 0
GASTROINTESTINAL NEGATIVE: 1
SHORTNESS OF BREATH: 1
WHEEZING: 1
COUGH: 1
VOMITING: 0

## 2018-09-24 NOTE — LETTER
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 If you have any questions or concerns, please don't hesitate to call.     Sincerely,        ALYSSA Keys CNP

## 2018-09-24 NOTE — PATIENT INSTRUCTIONS

## 2018-09-26 ENCOUNTER — OFFICE VISIT (OUTPATIENT)
Dept: FAMILY MEDICINE CLINIC | Age: 53
End: 2018-09-26
Payer: COMMERCIAL

## 2018-09-26 VITALS
OXYGEN SATURATION: 97 % | WEIGHT: 186.4 LBS | SYSTOLIC BLOOD PRESSURE: 122 MMHG | HEART RATE: 68 BPM | RESPIRATION RATE: 20 BRPM | TEMPERATURE: 98.5 F | HEIGHT: 61 IN | BODY MASS INDEX: 35.19 KG/M2 | DIASTOLIC BLOOD PRESSURE: 68 MMHG

## 2018-09-26 DIAGNOSIS — R05.9 COUGH: Primary | ICD-10-CM

## 2018-09-26 DIAGNOSIS — R76.8 POSITIVE ANA (ANTINUCLEAR ANTIBODY): ICD-10-CM

## 2018-09-26 PROBLEM — Z00.00 ROUTINE ADULT HEALTH MAINTENANCE: Status: RESOLVED | Noted: 2017-08-31 | Resolved: 2018-09-26

## 2018-09-26 PROCEDURE — G8427 DOCREV CUR MEDS BY ELIG CLIN: HCPCS | Performed by: FAMILY MEDICINE

## 2018-09-26 PROCEDURE — 3017F COLORECTAL CA SCREEN DOC REV: CPT | Performed by: FAMILY MEDICINE

## 2018-09-26 PROCEDURE — 1036F TOBACCO NON-USER: CPT | Performed by: FAMILY MEDICINE

## 2018-09-26 PROCEDURE — G8417 CALC BMI ABV UP PARAM F/U: HCPCS | Performed by: FAMILY MEDICINE

## 2018-09-26 PROCEDURE — 99213 OFFICE O/P EST LOW 20 MIN: CPT | Performed by: FAMILY MEDICINE

## 2018-09-26 ASSESSMENT — ENCOUNTER SYMPTOMS
COUGH: 1
SHORTNESS OF BREATH: 1

## 2018-09-26 ASSESSMENT — PATIENT HEALTH QUESTIONNAIRE - PHQ9
SUM OF ALL RESPONSES TO PHQ9 QUESTIONS 1 & 2: 0
SUM OF ALL RESPONSES TO PHQ QUESTIONS 1-9: 0
2. FEELING DOWN, DEPRESSED OR HOPELESS: 0
SUM OF ALL RESPONSES TO PHQ QUESTIONS 1-9: 0
1. LITTLE INTEREST OR PLEASURE IN DOING THINGS: 0

## 2018-09-26 NOTE — PROGRESS NOTES
9/26/2018    Baldemar Pacheco is a 52 y.o. female here for follow up    HPI   Here for cough and mild shortness of breath. Was seen by Urgent Care 2 days ago, started on Levaquin and prednisone. Had normal CXR.   - Endorses feeling fatigued, malaise. No fevers. - Overall symptoms have been present for about one week. Has been on medications prescribed as above for about 2 days. Feels like she has not noticed a difference on it. Review of Systems   Constitutional: Positive for fatigue. Negative for chills and fever. Respiratory: Positive for cough and shortness of breath. Cardiovascular: Negative for chest pain. Patient Active Problem List   Diagnosis    Brain aneurysm - rupture 20 yr ago    History of hepatitis C - tx w interferon, cure    Simple chronic bronchitis (HCC)    Other chest pain    Restrictive lung disease    Hypersomnia    Obesity (BMI 30-39. 9)    AMANDA (obstructive sleep apnea)     Prior to Visit Medications    Medication Sig Taking?  Authorizing Provider   levofloxacin (LEVAQUIN) 750 MG tablet Take 1 tablet by mouth daily for 5 days Yes ALYSSA Red - CNP   predniSONE (DELTASONE) 20 MG tablet Take 1 tablet by mouth 2 times daily for 5 days Yes ALYSSA Red - PAM   umeclidinium-vilanterol (ANORO ELLIPTA) 62.5-25 MCG/INH AEPB inhaler Inhale 1 puff into the lungs daily Yes Ivelisse Cross MD   albuterol sulfate HFA (PROAIR HFA) 108 (90 Base) MCG/ACT inhaler Inhale 2 puffs into the lungs every 6 hours as needed for Wheezing or Shortness of Breath Yes Ivelisse Cross MD   umeclidinium-vilanterol (ANORO ELLIPTA) 62.5-25 MCG/INH AEPB inhaler Inhale 1 puff into the lungs daily Yes Ivelisse Cross MD   albuterol sulfate HFA (PROAIR HFA) 108 (90 Base) MCG/ACT inhaler Inhale 2 puffs into the lungs every 6 hours as needed for Wheezing Yes Garret Coe MD   permethrin (ELIMITE) 5 % cream Apply topically as directed Yes Reina Ashton MD   Multiple 04/13/2018    K 4.1 04/13/2018    CREATININE 0.8 04/13/2018     No results found for: CHOL, LDLCALCNo results found for: HDLNo results found for: TRIG  Lab Results   Component Value Date    ALT 20 04/13/2018    AST 19 04/13/2018    ALKPHOS 74 04/13/2018    BILITOT 0.3 04/13/2018      Lab Results   Component Value Date    WBC 10.0 04/13/2018    HGB 14.3 04/13/2018    HCT 41.5 04/13/2018    MCV 90.9 04/13/2018     04/13/2018     TSH (uIU/mL)   Date Value   04/05/2018 1.58     Lab Results   Component Value Date    LABA1C 5.1 08/31/2017     No results found for: PSA, PSADIA      PHYSICAL EXAM  /68 (Site: Right Upper Arm, Position: Sitting, Cuff Size: Medium Adult)   Pulse 68   Temp 98.5 °F (36.9 °C) (Oral)   Resp 20   Ht 5' 1\" (1.549 m)   Wt 186 lb 6.4 oz (84.6 kg)   LMP 01/28/2010   SpO2 97%   BMI 35.22 kg/m²     BP Readings from Last 5 Encounters:   09/26/18 122/68   09/24/18 100/68   06/22/18 110/66   05/03/18 116/70   04/19/18 100/60       Wt Readings from Last 5 Encounters:   09/26/18 186 lb 6.4 oz (84.6 kg)   09/24/18 187 lb (84.8 kg)   06/22/18 191 lb (86.6 kg)   05/03/18 196 lb 3.2 oz (89 kg)   04/19/18 195 lb 12.8 oz (88.8 kg)      Vitals:    09/26/18 1537   BP: 122/68   Site: Right Upper Arm   Position: Sitting   Cuff Size: Medium Adult   Pulse: 68   Resp: 20   Temp: 98.5 °F (36.9 °C)   TempSrc: Oral   SpO2: 97%   Weight: 186 lb 6.4 oz (84.6 kg)   Height: 5' 1\" (1.549 m)       Physical Exam   Constitutional: She is oriented to person, place, and time. She appears well-developed and well-nourished. HENT:   Head: Normocephalic and atraumatic. Eyes: EOM are normal.   Neck: Normal range of motion. Cardiovascular: Normal rate, regular rhythm and normal heart sounds. Pulmonary/Chest: Effort normal and breath sounds normal.   Musculoskeletal: Normal range of motion. She exhibits no edema or tenderness. Neurological: She is alert and oriented to person, place, and time.  She has normal

## 2018-09-26 NOTE — Clinical Note
Anuj Mcgraw,  I saw Yue Lopez today and noticed she had a positive ABRAHAN and dsDNA. I know this was part of her workup for her restrictive lung disease, but wanted to see if you felt this warranted a referral to Rheumatology for some of her other issues (fatigue, joint pain, etc.). Just wanted to run it by you, or if you'd rather manage this result finding as relates to her lungs. Happy to coordinate referral if needed.   Thanks, Damion Stone

## 2018-09-27 ENCOUNTER — OFFICE VISIT (OUTPATIENT)
Dept: PULMONOLOGY | Age: 53
End: 2018-09-27
Payer: COMMERCIAL

## 2018-09-27 VITALS
HEART RATE: 67 BPM | DIASTOLIC BLOOD PRESSURE: 82 MMHG | TEMPERATURE: 98.1 F | OXYGEN SATURATION: 98 % | SYSTOLIC BLOOD PRESSURE: 114 MMHG | BODY MASS INDEX: 34.55 KG/M2 | RESPIRATION RATE: 16 BRPM | WEIGHT: 183 LBS | HEIGHT: 61 IN

## 2018-09-27 DIAGNOSIS — G25.81 RESTLESS LEGS SYNDROME (RLS): ICD-10-CM

## 2018-09-27 DIAGNOSIS — G47.33 OSA (OBSTRUCTIVE SLEEP APNEA): Primary | ICD-10-CM

## 2018-09-27 DIAGNOSIS — R25.2 LEG CRAMPING: ICD-10-CM

## 2018-09-27 DIAGNOSIS — J98.4 RESTRICTIVE LUNG DISEASE: ICD-10-CM

## 2018-09-27 LAB
FERRITIN: 108.7 NG/ML (ref 15–150)
VITAMIN D 25-HYDROXY: 83.3 NG/ML

## 2018-09-27 PROCEDURE — 3017F COLORECTAL CA SCREEN DOC REV: CPT | Performed by: INTERNAL MEDICINE

## 2018-09-27 PROCEDURE — G8417 CALC BMI ABV UP PARAM F/U: HCPCS | Performed by: INTERNAL MEDICINE

## 2018-09-27 PROCEDURE — 99214 OFFICE O/P EST MOD 30 MIN: CPT | Performed by: INTERNAL MEDICINE

## 2018-09-27 PROCEDURE — G8427 DOCREV CUR MEDS BY ELIG CLIN: HCPCS | Performed by: INTERNAL MEDICINE

## 2018-09-27 PROCEDURE — 1036F TOBACCO NON-USER: CPT | Performed by: INTERNAL MEDICINE

## 2018-09-27 NOTE — LETTER
SSRIs (eg, paroxetine, fluoxetine, sertraline)   Tricyclics (eg, amitriptyline, nortriptyline)   Dopamine antagonists (eg, clozapine, risperidone)   Atypical antipsychotics (eg, olanzepine)            Relevant Orders    Ferritin    VITAMIN D 25 HYDROXY    AMANDA (obstructive sleep apnea) - Primary     She completed her sleep study that demonstrated mild sleep apnea. However, she has not scheduled her follow-up titration. With her having moderate restrictive lung disease, she may need BiPAP. I encouraged her to schedule this titration. It is not clear how much of her fatigue is secondary to untreated sleep apnea versus other etiology such as autoimmune disease such as lupus. Leg cramping     Advised daily exercise including stretching. I advised her that nightly stretching is most effective treatment for leg cramps.                  Sincerely,    Delia Jauregui [facility]  [phone]

## 2018-09-27 NOTE — ASSESSMENT & PLAN NOTE
She is having nightly symptoms. I will assess her ferritin. If less than 50, treatment with iron. If greater than 50, we'll consider treatment. RLS (restless leg syndrome)/WED ([place] Disease)   Her symptoms are consistent with WED. Therefore, I will order work up: CBC, serum ferritin, percent iron saturation, folate, chemistries (BUN, creatinine), glucose, glycosylated HGB    Treatments with be considered:  1. Exercise and weight loss   . This is the best treatment for WED. 2. Iron replacement if ferritin is < 50  3. Requip 1.56 mg/d in single or divided doses or Mirapex  0.125-0.75 mg/d in single or divided doses if liver disease is present.      4. gabapentin Enacarbril extended release 600 mg at 5 pm with food      Remove potential aggravators   Alcohol   Exercise (too much vs too little)   Caffeine   Smoking     Medications that can worsen RLS   SSRIs (eg, paroxetine, fluoxetine, sertraline)   Tricyclics (eg, amitriptyline, nortriptyline)   Dopamine antagonists (eg, clozapine, risperidone)   Atypical antipsychotics (eg, olanzepine)

## 2018-09-27 NOTE — ASSESSMENT & PLAN NOTE
Advised daily exercise including stretching. I advised her that nightly stretching is most effective treatment for leg cramps.

## 2018-10-03 ENCOUNTER — OFFICE VISIT (OUTPATIENT)
Dept: RHEUMATOLOGY | Age: 53
End: 2018-10-03
Payer: COMMERCIAL

## 2018-10-03 VITALS
TEMPERATURE: 97.1 F | DIASTOLIC BLOOD PRESSURE: 77 MMHG | SYSTOLIC BLOOD PRESSURE: 109 MMHG | BODY MASS INDEX: 35.38 KG/M2 | WEIGHT: 187.4 LBS | HEIGHT: 61 IN

## 2018-10-03 DIAGNOSIS — J98.4 RESTRICTIVE LUNG DISEASE: ICD-10-CM

## 2018-10-03 DIAGNOSIS — R76.8 POSITIVE ANA (ANTINUCLEAR ANTIBODY): ICD-10-CM

## 2018-10-03 DIAGNOSIS — M15.9 PRIMARY OSTEOARTHRITIS INVOLVING MULTIPLE JOINTS: ICD-10-CM

## 2018-10-03 DIAGNOSIS — J98.4 RESTRICTIVE LUNG DISEASE: Primary | ICD-10-CM

## 2018-10-03 LAB
A/G RATIO: 1.2 (ref 1.1–2.2)
ALBUMIN SERPL-MCNC: 4.1 G/DL (ref 3.4–5)
ALP BLD-CCNC: 89 U/L (ref 40–129)
ALT SERPL-CCNC: 17 U/L (ref 10–40)
ANION GAP SERPL CALCULATED.3IONS-SCNC: 12 MMOL/L (ref 3–16)
AST SERPL-CCNC: 18 U/L (ref 15–37)
BASOPHILS ABSOLUTE: 0 K/UL (ref 0–0.2)
BASOPHILS RELATIVE PERCENT: 0.5 %
BILIRUB SERPL-MCNC: 0.4 MG/DL (ref 0–1)
BUN BLDV-MCNC: 13 MG/DL (ref 7–20)
C-REACTIVE PROTEIN: 5.3 MG/L (ref 0–5.1)
CALCIUM SERPL-MCNC: 9.5 MG/DL (ref 8.3–10.6)
CHLORIDE BLD-SCNC: 102 MMOL/L (ref 99–110)
CO2: 26 MMOL/L (ref 21–32)
CREAT SERPL-MCNC: 0.9 MG/DL (ref 0.6–1.1)
EOSINOPHILS ABSOLUTE: 0.3 K/UL (ref 0–0.6)
EOSINOPHILS RELATIVE PERCENT: 3.7 %
GFR AFRICAN AMERICAN: >60
GFR NON-AFRICAN AMERICAN: >60
GLOBULIN: 3.3 G/DL
GLUCOSE BLD-MCNC: 82 MG/DL (ref 70–99)
HCT VFR BLD CALC: 44.4 % (ref 36–48)
HEMOGLOBIN: 14.8 G/DL (ref 12–16)
LYMPHOCYTES ABSOLUTE: 2.6 K/UL (ref 1–5.1)
LYMPHOCYTES RELATIVE PERCENT: 31.1 %
MCH RBC QN AUTO: 31.1 PG (ref 26–34)
MCHC RBC AUTO-ENTMCNC: 33.4 G/DL (ref 31–36)
MCV RBC AUTO: 93.2 FL (ref 80–100)
MONOCYTES ABSOLUTE: 0.8 K/UL (ref 0–1.3)
MONOCYTES RELATIVE PERCENT: 9.2 %
NEUTROPHILS ABSOLUTE: 4.6 K/UL (ref 1.7–7.7)
NEUTROPHILS RELATIVE PERCENT: 55.5 %
PDW BLD-RTO: 13.9 % (ref 12.4–15.4)
PLATELET # BLD: 231 K/UL (ref 135–450)
PMV BLD AUTO: 9.7 FL (ref 5–10.5)
POTASSIUM SERPL-SCNC: 4.4 MMOL/L (ref 3.5–5.1)
RBC # BLD: 4.77 M/UL (ref 4–5.2)
SEDIMENTATION RATE, ERYTHROCYTE: 22 MM/HR (ref 0–30)
SODIUM BLD-SCNC: 140 MMOL/L (ref 136–145)
TOTAL PROTEIN: 7.4 G/DL (ref 6.4–8.2)
WBC # BLD: 8.3 K/UL (ref 4–11)

## 2018-10-03 PROCEDURE — G8484 FLU IMMUNIZE NO ADMIN: HCPCS | Performed by: INTERNAL MEDICINE

## 2018-10-03 PROCEDURE — 99205 OFFICE O/P NEW HI 60 MIN: CPT | Performed by: INTERNAL MEDICINE

## 2018-10-03 PROCEDURE — 3017F COLORECTAL CA SCREEN DOC REV: CPT | Performed by: INTERNAL MEDICINE

## 2018-10-03 PROCEDURE — G8417 CALC BMI ABV UP PARAM F/U: HCPCS | Performed by: INTERNAL MEDICINE

## 2018-10-03 PROCEDURE — G8427 DOCREV CUR MEDS BY ELIG CLIN: HCPCS | Performed by: INTERNAL MEDICINE

## 2018-10-03 RX ORDER — PREDNISONE 2.5 MG
2.5 TABLET ORAL 2 TIMES DAILY
COMMUNITY
End: 2018-10-22 | Stop reason: ALTCHOICE

## 2018-10-03 NOTE — PROGRESS NOTES
10/3/2018  Patient Name: Bryson Bey  : 1965  Medical Record: [id number]    MEDICATIONS  Current Outpatient Prescriptions   Medication Sig Dispense Refill    predniSONE (DELTASONE) 2.5 MG tablet Take 2.5 mg by mouth 2 times daily      umeclidinium-vilanterol (ANORO ELLIPTA) 62.5-25 MCG/INH AEPB inhaler Inhale 1 puff into the lungs daily 1 puff 5    albuterol sulfate HFA (PROAIR HFA) 108 (90 Base) MCG/ACT inhaler Inhale 2 puffs into the lungs every 6 hours as needed for Wheezing 1 Inhaler 3    permethrin (ELIMITE) 5 % cream Apply topically as directed 2 Tube 0    Multiple Vitamins-Minerals (THERAPEUTIC MULTIVITAMIN-MINERALS) tablet Take 1 tablet by mouth daily      ferrous sulfate 325 (65 Fe) MG tablet Take 325 mg by mouth daily (with breakfast)      vitamin D (CHOLECALCIFEROL) 1000 UNIT TABS tablet Take 1,000 Units by mouth daily      fluticasone (FLONASE) 50 MCG/ACT nasal spray 2 sprays by Nasal route daily (Patient taking differently: 2 sprays by Nasal route daily as needed for Rhinitis or Allergies ) 1 Bottle 0     No current facility-administered medications for this visit. ALLERGIES  Allergies   Allergen Reactions    Aspirin Itching         Comments  No specialty comments available. REFERRING PHYSICIAN:    HISTORY OF PRESENT ILLNESS  Bryson Bey is a 52 y.o. female with past medical history of brain aneurysm and indicated by pneumonia and tracheostomy, COPD, hepatitis C who is being seen for follow up evaluation of Abnormal CT chest and PFTs. She has a history of brain aneurysm diagnosed in  status post rupture. Her hospital course was complicated by pneumonia requiring tracheostomy. She has had multiple episodes of pneumonia since then. She also has COPD. She presented with shortness of breath and fatigue 3 months ago which led to the further workup including CT chest and stress echo.   Her CT chest revealed extensive bilateral upper lobe predominant peribronchial interstitial lung disease. No history of pleurisy. No history of tuberculosis or atypical infections. Gastrointestinal: No history of heart burn, dysphagia or esophageal dysmotility. No change in bowel habits or any inflammatory bowel disease. Genitourinary: No history renal disease, miscarriages. Hematologic/Lymphatic: No abnormal bruising or bleeding, blood clots or swollen lymph nodes. Neurological: No history seizure or focal weakness. No history of neuropathies, paresthesias or hyperesthesias, facial droop, diplopia  Psychiatric: No history of bipolar disease, depression  Endocrine: Denies any thyroid / parathyroid disease and osteoporosis  Allergic/Immunologic: No nasal congestion or hives. I have reviewed patients Past medical History, Social History and Family History as mentioned in her chart and this remains unchanged from previous. Past Medical History:   Diagnosis Date    Aneurysm ([facility] [address]     Anxiety 4/28/2010    COPD (chronic obstructive pulmonary disease) ([facility] [address] 8/31/2017    History of hepatitis C - tx w interferon, cure 8/31/2017    Migraine     Obesity (BMI 30-39.9) 6/22/2018    Restless legs syndrome (RLS) 9/27/2018    Simple chronic bronchitis ([facility] [address] 8/31/2017    Start Breo 9/2017     Past Surgical History:   Procedure Laterality Date    BRAIN SURGERY  1998    TRACHEOSTOMY  1998    VAGINA SURGERY       Social History     Social History    Marital status:      Spouse name: N/A    Number of children: N/A    Years of education: N/A     Occupational History    Not on file.      Social History Main Topics    Smoking status: Former Smoker     Packs/day: 2.00     Years: 22.00     Types: Cigarettes    Smokeless tobacco: Never Used      Comment: 20 years ago    Alcohol use No    Drug use: No    Sexual activity: Yes     Partners: Male     Other Topics Concern    Not on file     Social History Narrative    No narrative on file     Family History   Problem Relation Age of Onset    Alzheimer's Disease Father     Parkinsonism Father     High Blood Pressure Sister     Ovarian Cancer Sister     Cancer Brother     Breast Cancer Neg Hx     Depression Neg Hx          PHYSICAL EXAM   Vitals:    10/03/18 0853   BP: 109/77   Site: Left Upper Arm   Position: Sitting   Temp: 97.1 °F (36.2 °C)   Weight: 187 lb 6.4 oz (85 kg)   Height: 5' 1\" (1.549 m)     Physical Exam  Constitutional:  Well developed, well nourished, no acute distress, non-toxic appearance   Musculoskeletal:    RIGHT  Swell  Tender  ROM  LEFT  Swell  Tender  ROM    DIP2  0  0  [place]   0  0  Heberden    DIP3  0  0  [place]   0  0  FULL    DIP4  0  0  FULL   0  0  FULL    DIP5  0  0  FULL   0  0  FULL    PIP1  0  0  FULL   0  0  FULL    PIP2  0  0  FULL   0  0  FULL    PIP3  0  0  FULL   0  0  FULL    PIP4  0  0  FULL   0  0  FULL    PIP5  0  0  FULL   0  0  FULL    MCP1  0  0  FULL   0  0  FULL    MCP2  0  0  FULL   0  0  FULL    MCP3  0  0  FULL   0  0  FULL    MCP4  0  0  FULL   0  0  FULL    MCP5  0  0  FULL   0  0  FULL    Wrist  0  0  FULL   0  0  FULL    Elbow  0  0  FULL   0  0  FULL    Shouldr  0  0  FULL   0  0  FULL    Hip  0  0  FULL   0  0  FULL    Knee  0  0  Crepitus   0  0  Crepitus    Ankle  0  0  FULL   0  0  FULL    MTP1  0  0  FULL   0  0  FULL    MTP2  0  0  FULL   0  0  FULL    MTP3  0  0  FULL   0  0  FULL    MTP4  0  0  FULL   0  0  FULL    MTP5  0  0  FULL   0  0  FULL    IP1  0  0  FULL   0  0  FULL    IP2  0  0  FULL   0  0  FULL    IP3  0  0  FULL   0  0  FULL    IP4  0  0  FULL   0  0  FULL    IP5  0  0  FULL   0 0 FULL     Squaring and bony enlargement of bilateral CMC joints  Ambulates without assistance, normal gait  Neck: Full ROM, no tenderness, supple   Back- no tenderness. Eyes:  PERRL, extra ocular movements intact, conjunctiva normal   HEENT:  Atraumatic, normocephalic, external ears normal, oropharynx moist, no pharyngeal exudates.    Respiratory:  No respiratory distress  GI:

## 2018-10-05 LAB
ANTI JO-1 IGG: 0 AU/ML (ref 0–40)
CENTROMERE AB IGG: 0 AU/ML (ref 0–40)
MYELOPEROXIDASE AB: 0 AU/ML (ref 0–19)
SERINE PROTEASE 3 AB: 0 AU/ML (ref 0–19)
SSA 52 (RO) (ENA) AB, IGG: 12 AU/ML (ref 0–40)
SSA 60 (RO) (ENA) AB, IGG: 2 AU/ML (ref 0–40)

## 2018-10-09 ENCOUNTER — HOSPITAL ENCOUNTER (OUTPATIENT)
Dept: CT IMAGING | Age: 53
Discharge: HOME | End: 2018-10-09
Payer: COMMERCIAL

## 2018-10-09 DIAGNOSIS — J41.0 SIMPLE CHRONIC BRONCHITIS (HCC): ICD-10-CM

## 2018-10-09 PROCEDURE — 71250 CT THORAX DX C-: CPT

## 2018-10-11 DIAGNOSIS — J84.9 ILD (INTERSTITIAL LUNG DISEASE) (HCC): Primary | ICD-10-CM

## 2018-10-15 DIAGNOSIS — J84.9 ILD (INTERSTITIAL LUNG DISEASE) (HCC): ICD-10-CM

## 2018-10-17 LAB — ANGIOTENSIN CONVERTING ENZYME: 19 U/L (ref 9–67)

## 2018-10-22 ENCOUNTER — OFFICE VISIT (OUTPATIENT)
Dept: PULMONOLOGY | Age: 53
End: 2018-10-22
Payer: COMMERCIAL

## 2018-10-22 VITALS
TEMPERATURE: 98.2 F | WEIGHT: 188 LBS | HEART RATE: 66 BPM | HEIGHT: 61 IN | SYSTOLIC BLOOD PRESSURE: 96 MMHG | DIASTOLIC BLOOD PRESSURE: 60 MMHG | RESPIRATION RATE: 16 BRPM | OXYGEN SATURATION: 98 % | BODY MASS INDEX: 35.5 KG/M2

## 2018-10-22 DIAGNOSIS — J98.4 RESTRICTIVE LUNG DISEASE: Primary | ICD-10-CM

## 2018-10-22 DIAGNOSIS — J41.0 SIMPLE CHRONIC BRONCHITIS (HCC): ICD-10-CM

## 2018-10-22 DIAGNOSIS — G47.33 OSA (OBSTRUCTIVE SLEEP APNEA): ICD-10-CM

## 2018-10-22 PROCEDURE — 99214 OFFICE O/P EST MOD 30 MIN: CPT | Performed by: INTERNAL MEDICINE

## 2018-10-22 PROCEDURE — G8926 SPIRO NO PERF OR DOC: HCPCS | Performed by: INTERNAL MEDICINE

## 2018-10-22 PROCEDURE — 1036F TOBACCO NON-USER: CPT | Performed by: INTERNAL MEDICINE

## 2018-10-22 PROCEDURE — 3023F SPIROM DOC REV: CPT | Performed by: INTERNAL MEDICINE

## 2018-10-22 PROCEDURE — G8427 DOCREV CUR MEDS BY ELIG CLIN: HCPCS | Performed by: INTERNAL MEDICINE

## 2018-10-22 PROCEDURE — 3017F COLORECTAL CA SCREEN DOC REV: CPT | Performed by: INTERNAL MEDICINE

## 2018-10-22 PROCEDURE — G8484 FLU IMMUNIZE NO ADMIN: HCPCS | Performed by: INTERNAL MEDICINE

## 2018-10-22 PROCEDURE — G8417 CALC BMI ABV UP PARAM F/U: HCPCS | Performed by: INTERNAL MEDICINE

## 2018-10-22 NOTE — PROGRESS NOTES
REASON FOR CONSULTATION/CC:   Chief Complaint   Patient presents with    Shortness of Breath     f/u SOB        Consult at request of  Linden Lacy MD     PCP: Linden Lacy MD    HISTORY OF PRESENT ILLNESS: Sheryl Garcia is a 52y.o. year old female with a history of Smoking, brain injury , complicated by pneumonia and tracheostomy who presents         Restrictive lung disease  She was referred to rheumatology to assess pain and DNA positive. CT chest completed  Continues to use Anoro and albuterol. Does not believe this is helping shortness of breath. dyspnea on exertion getting worse. Simple chronic bronchitis  Continues on albuterol and anoro      AMNADA  Titration non complete. To schedule. PAST MEDICAL HISTORY:  Past Medical History:   Diagnosis Date    [facility]m ([facility]     Anxiety 4/28/2010    COPD (chronic obstructive pulmonary disease) ([facility] [address] [facility] 8/31/2017    History of hepatitis C - tx w interferon, cure 8/31/2017    Migraine     Obesity (BMI 30-39.9) 6/22/2018    Restless legs syndrome (RLS) 9/27/2018    Simple chronic bronchitis ([facility] [address] [facility] 8/31/2017    Start Breo 9/2017       PAST SURGICAL HISTORY:  Past Surgical History:   Procedure Laterality Date    BRAIN SURGERY  1998    TRACHEOSTOMY  1998    VAGINA SURGERY         FAMILY HISTORY:  family history includes Alzheimer's Disease in her father; Cancer in her brother; High Blood Pressure in her sister; Ovarian Cancer in her sister; Parkinsonism in her father. SOCIAL HISTORY:   reports that she has quit smoking. Her smoking use included Cigarettes. She has a 44.00 pack-year smoking history. She has never used smokeless tobacco.      ALLERGIES:  Patient is allergic to aspirin.     REVIEW OF SYSTEMS:  Constitutional: Negative for fever   HENT: Negative for sore throat  Eyes: Negative for redness   Respiratory: ++for dyspnea, + cough Worsening  Cardiovascular: Negative for chest pain  Gastrointestinal: Negative for vomiting, diarrhea radiograph  · Snoring  · Hypersomnia  · Restrictive lung disease  · Simple chronic bronchitis      Plan:           Problem List Items Addressed This Visit     Simple chronic bronchitis ([address]     Having increasing shortness of breath. Continues on Anoro and albuterol. Restrictive lung disease - Primary     We discussed the findings from rheumatology and high-resolution CAT scan. ABRAHAN and double stranded DNA positive. Sed rate and CRP both negative. Therefore, I believe that her CT chest findings are consistent with scarring from prior pneumonia. We discussed differential including sarcoidosis. After this discussion, she, with increasing symptoms, would like to have biopsy. We'll schedule bronchoscopy with transbronchial biopsies. I will complete a right upper lobe biopsy. AMANDA (obstructive sleep apnea)     She still has not completed her titration study. She is to call and schedule this. This note was transcribed using [id number]. Please disregard any translational errors.

## 2018-10-24 ENCOUNTER — TELEPHONE (OUTPATIENT)
Dept: PULMONOLOGY | Age: 53
End: 2018-10-24

## 2018-10-25 ENCOUNTER — HOSPITAL ENCOUNTER (OUTPATIENT)
Dept: GENERAL RADIOLOGY | Age: 53
Discharge: HOME | End: 2018-10-25
Payer: COMMERCIAL

## 2018-10-25 ENCOUNTER — HOSPITAL ENCOUNTER (OUTPATIENT)
Age: 53
Setting detail: OUTPATIENT SURGERY
Discharge: HOME | End: 2018-10-25
Attending: INTERNAL MEDICINE | Admitting: INTERNAL MEDICINE
Payer: COMMERCIAL

## 2018-10-25 VITALS
TEMPERATURE: 98 F | HEIGHT: 61 IN | BODY MASS INDEX: 35.28 KG/M2 | WEIGHT: 186.84 LBS | SYSTOLIC BLOOD PRESSURE: 118 MMHG | DIASTOLIC BLOOD PRESSURE: 70 MMHG | RESPIRATION RATE: 16 BRPM | OXYGEN SATURATION: 98 % | HEART RATE: 80 BPM

## 2018-10-25 DIAGNOSIS — J41.0 SIMPLE CHRONIC BRONCHITIS (HCC): ICD-10-CM

## 2018-10-25 DIAGNOSIS — J98.4 RESTRICTIVE LUNG DISEASE: ICD-10-CM

## 2018-10-25 PROCEDURE — 87206 SMEAR FLUORESCENT/ACID STAI: CPT

## 2018-10-25 PROCEDURE — 6360000002 HC RX W HCPCS: Performed by: INTERNAL MEDICINE

## 2018-10-25 PROCEDURE — 88184 FLOWCYTOMETRY/ TC 1 MARKER: CPT

## 2018-10-25 PROCEDURE — 89051 BODY FLUID CELL COUNT: CPT

## 2018-10-25 PROCEDURE — 2500000003 HC RX 250 WO HCPCS: Performed by: ANESTHESIOLOGY

## 2018-10-25 PROCEDURE — 87102 FUNGUS ISOLATION CULTURE: CPT

## 2018-10-25 PROCEDURE — 7100000011 HC PHASE II RECOVERY - ADDTL 15 MIN: Performed by: INTERNAL MEDICINE

## 2018-10-25 PROCEDURE — 3609011800 HC BRONCHOSCOPY/TRANSBRONCHIAL LUNG BIOPSY: Performed by: INTERNAL MEDICINE

## 2018-10-25 PROCEDURE — 7100000010 HC PHASE II RECOVERY - FIRST 15 MIN: Performed by: INTERNAL MEDICINE

## 2018-10-25 PROCEDURE — 88305 TISSUE EXAM BY PATHOLOGIST: CPT

## 2018-10-25 PROCEDURE — 87070 CULTURE OTHR SPECIMN AEROBIC: CPT

## 2018-10-25 PROCEDURE — 31628 BRONCHOSCOPY/LUNG BX EACH: CPT | Performed by: INTERNAL MEDICINE

## 2018-10-25 PROCEDURE — 88185 FLOWCYTOMETRY/TC ADD-ON: CPT

## 2018-10-25 PROCEDURE — 2709999900 HC NON-CHARGEABLE SUPPLY: Performed by: INTERNAL MEDICINE

## 2018-10-25 PROCEDURE — 3209999900 FLUORO FOR SURGICAL PROCEDURES

## 2018-10-25 PROCEDURE — 3609010800 HC BRONCHOSCOPY ALVEOLAR LAVAGE: Performed by: INTERNAL MEDICINE

## 2018-10-25 PROCEDURE — 2500000003 HC RX 250 WO HCPCS: Performed by: INTERNAL MEDICINE

## 2018-10-25 PROCEDURE — 31624 DX BRONCHOSCOPE/LAVAGE: CPT | Performed by: INTERNAL MEDICINE

## 2018-10-25 PROCEDURE — 87116 MYCOBACTERIA CULTURE: CPT

## 2018-10-25 PROCEDURE — 87015 SPECIMEN INFECT AGNT CONCNTJ: CPT

## 2018-10-25 PROCEDURE — 87205 SMEAR GRAM STAIN: CPT

## 2018-10-25 PROCEDURE — S0028 INJECTION, FAMOTIDINE, 20 MG: HCPCS | Performed by: ANESTHESIOLOGY

## 2018-10-25 PROCEDURE — 2580000003 HC RX 258: Performed by: ANESTHESIOLOGY

## 2018-10-25 PROCEDURE — 99152 MOD SED SAME PHYS/QHP 5/>YRS: CPT | Performed by: INTERNAL MEDICINE

## 2018-10-25 RX ORDER — LIDOCAINE HYDROCHLORIDE 20 MG/ML
INJECTION, SOLUTION EPIDURAL; INFILTRATION; INTRACAUDAL; PERINEURAL
Status: COMPLETED | OUTPATIENT
Start: 2018-10-25 | End: 2018-10-25

## 2018-10-25 RX ORDER — SODIUM CHLORIDE 9 MG/ML
INJECTION, SOLUTION INTRAVENOUS CONTINUOUS
Status: DISCONTINUED | OUTPATIENT
Start: 2018-10-25 | End: 2018-10-25 | Stop reason: HOSPADM

## 2018-10-25 RX ORDER — LIDOCAINE HYDROCHLORIDE 40 MG/ML
INJECTION, SOLUTION RETROBULBAR; TOPICAL
Status: COMPLETED | OUTPATIENT
Start: 2018-10-25 | End: 2018-10-25

## 2018-10-25 RX ORDER — MIDAZOLAM HYDROCHLORIDE 1 MG/ML
INJECTION INTRAMUSCULAR; INTRAVENOUS
Status: COMPLETED | OUTPATIENT
Start: 2018-10-25 | End: 2018-10-25

## 2018-10-25 RX ORDER — FENTANYL CITRATE 50 UG/ML
INJECTION, SOLUTION INTRAMUSCULAR; INTRAVENOUS
Status: COMPLETED | OUTPATIENT
Start: 2018-10-25 | End: 2018-10-25

## 2018-10-25 RX ORDER — SODIUM CHLORIDE 0.9 % (FLUSH) 0.9 %
10 SYRINGE (ML) INJECTION EVERY 12 HOURS SCHEDULED
Status: DISCONTINUED | OUTPATIENT
Start: 2018-10-25 | End: 2018-10-25 | Stop reason: HOSPADM

## 2018-10-25 RX ORDER — SODIUM CHLORIDE 9 MG/ML
INJECTION, SOLUTION INTRAVENOUS CONTINUOUS
Status: DISCONTINUED | OUTPATIENT
Start: 2018-10-25 | End: 2018-10-25 | Stop reason: SDUPTHER

## 2018-10-25 RX ORDER — SODIUM CHLORIDE 0.9 % (FLUSH) 0.9 %
10 SYRINGE (ML) INJECTION PRN
Status: DISCONTINUED | OUTPATIENT
Start: 2018-10-25 | End: 2018-10-25 | Stop reason: HOSPADM

## 2018-10-25 RX ADMIN — SODIUM CHLORIDE: 9 INJECTION, SOLUTION INTRAVENOUS at 14:05

## 2018-10-25 RX ADMIN — FAMOTIDINE 20 MG: 10 INJECTION, SOLUTION INTRAVENOUS at 14:06

## 2018-10-25 ASSESSMENT — PAIN SCALES - GENERAL
PAINLEVEL_OUTOF10: 0
PAINLEVEL_OUTOF10: 0

## 2018-10-25 ASSESSMENT — PAIN - FUNCTIONAL ASSESSMENT: PAIN_FUNCTIONAL_ASSESSMENT: 0-10

## 2018-10-25 NOTE — H&P
Wheezing  Multiple Vitamins-Minerals (THERAPEUTIC MULTIVITAMIN-MINERALS) tablet, Take 1 tablet by mouth daily  vitamin D (CHOLECALCIFEROL) 1000 UNIT TABS tablet, Take 1,000 Units by mouth daily  permethrin (ELIMITE) 5 % cream, Apply topically as directed  ferrous sulfate 325 (65 Fe) MG tablet, Take 325 mg by mouth daily (with breakfast)  fluticasone (FLONASE) 50 MCG/ACT nasal spray, 2 sprays by Nasal route daily (Patient taking differently: 2 sprays by Nasal route daily as needed for Rhinitis or Allergies )    ROS:  Chest pain?  no  Shortness of breath? yes  Recent illness?   no  Prior Anesthesia?   no  Prior reaction to Anesthesia?   no    Review of Nursing History/Assessment:  yes    IV site verified? yes    Vitals:  Blood pressure 120/74, pulse 57, temperature 98.6 °F (37 °C), temperature source Temporal, resp. rate 16, height 5' 1\" (1.549 m), weight 186 lb 13.4 oz (84.8 kg), last menstrual period 01/28/2010, SpO2 95 %, not currently breastfeeding. Physical Exam:  Neuro:  Alert and Oriented x 4, Follows commands  Cardio:  Regular rate and rhythm, no murmurs/rub/gallop  Pulm:  Clear to auscultation bilaterally, no wheezes, rhonchi, rub  Abdomen:  Soft, nontender, nondistended. Positive bowel sounds.     Airway Assessment:   wnl    Mallampati Classification:  II (soft palate, uvula, fauces visible)    ASA Classification:  ASA 2 - Patient with mild systemic disease with no functional limitations    Relevant diagnostic tests and images reviewed:   yes    Medication Reconciliation Reviewed:  yes     Labs:   Lab Results   Component Value Date    GLUCOSE 82 10/03/2018    BUN 13 10/03/2018    CO2 26 10/03/2018    CREATININE 0.9 10/03/2018    K 4.4 10/03/2018     10/03/2018     10/03/2018    CALCIUM 9.5 10/03/2018     Lab Results   Component Value Date    WBC 8.3 10/03/2018    HGB 14.8 10/03/2018    HCT 44.4 10/03/2018    MCV 93.2 10/03/2018     10/03/2018     No results found for: PTT,

## 2018-10-26 LAB
APPEARANCE BAL (LAVAGE): ABNORMAL
CLOT EVALUATION BAL: ABNORMAL
COLOR LAVAGE: ABNORMAL
EOSIN: 5 %
EPITHELIAL CELLS FLUID: 3 %
LYMPHOCYTES, BAL: 4 % (ref 5–10)
MACROPHAGES, BAL: 39 % (ref 90–95)
MONOCYTES, BAL: 8 %
NUMBER OF CELLS COUNTED BAL (LAVAGE): 200
RBC, BAL: 5373 /CUMM
SEGMENTED NEUTROPHILS, BAL: 41 % (ref 5–10)
WBC/EPI CELLS BAL: 102 /CUMM

## 2018-10-27 LAB
CULTURE, RESPIRATORY: NORMAL
GRAM STAIN RESULT: NORMAL

## 2018-10-31 ENCOUNTER — TELEPHONE (OUTPATIENT)
Dept: PULMONOLOGY | Age: 53
End: 2018-10-31

## 2018-10-31 NOTE — TELEPHONE ENCOUNTER
Received Janeth's [place] paperwork.   Called her to let her know this and would contact her when Dr Angeles Rod is finished with them

## 2018-11-01 ENCOUNTER — TELEPHONE (OUTPATIENT)
Dept: PULMONOLOGY | Age: 53
End: 2018-11-01

## 2018-11-01 DIAGNOSIS — J84.115 RESPIRATORY BRONCHIOLITIS INTERSTITIAL LUNG DISEASE (HCC): Primary | ICD-10-CM

## 2018-11-01 RX ORDER — PREDNISONE 10 MG/1
TABLET ORAL
Qty: 42 TABLET | Refills: 0 | Status: SHIPPED | OUTPATIENT
Start: 2018-11-01 | End: 2019-01-16 | Stop reason: ALTCHOICE

## 2018-11-01 NOTE — TELEPHONE ENCOUNTER
Pt called stating she received the [facility] in [facility] that you were sending prednisone in, I do not see where this has been sent in, however, she wanted to make sure it went to [facility], I made sure that was the pharmacy in chart

## 2018-11-05 ENCOUNTER — TELEPHONE (OUTPATIENT)
Dept: PULMONOLOGY | Age: 53
End: 2018-11-05

## 2018-11-14 ENCOUNTER — OFFICE VISIT (OUTPATIENT)
Dept: RHEUMATOLOGY | Age: 53
End: 2018-11-14
Payer: COMMERCIAL

## 2018-11-14 ENCOUNTER — TELEPHONE (OUTPATIENT)
Dept: PULMONOLOGY | Age: 53
End: 2018-11-14

## 2018-11-14 VITALS
SYSTOLIC BLOOD PRESSURE: 113 MMHG | TEMPERATURE: 97.9 F | WEIGHT: 191 LBS | BODY MASS INDEX: 36.06 KG/M2 | HEIGHT: 61 IN | HEART RATE: 76 BPM | DIASTOLIC BLOOD PRESSURE: 73 MMHG

## 2018-11-14 DIAGNOSIS — J98.4 RESTRICTIVE LUNG DISEASE: Primary | ICD-10-CM

## 2018-11-14 DIAGNOSIS — R76.8 POSITIVE ANA (ANTINUCLEAR ANTIBODY): ICD-10-CM

## 2018-11-14 DIAGNOSIS — M15.9 PRIMARY OSTEOARTHRITIS INVOLVING MULTIPLE JOINTS: ICD-10-CM

## 2018-11-14 DIAGNOSIS — J98.4 RESTRICTIVE LUNG DISEASE: ICD-10-CM

## 2018-11-14 LAB — RHEUMATOID FACTOR: <10 IU/ML

## 2018-11-14 PROCEDURE — G8484 FLU IMMUNIZE NO ADMIN: HCPCS | Performed by: INTERNAL MEDICINE

## 2018-11-14 PROCEDURE — G8417 CALC BMI ABV UP PARAM F/U: HCPCS | Performed by: INTERNAL MEDICINE

## 2018-11-14 PROCEDURE — 3017F COLORECTAL CA SCREEN DOC REV: CPT | Performed by: INTERNAL MEDICINE

## 2018-11-14 PROCEDURE — G8427 DOCREV CUR MEDS BY ELIG CLIN: HCPCS | Performed by: INTERNAL MEDICINE

## 2018-11-14 PROCEDURE — 99214 OFFICE O/P EST MOD 30 MIN: CPT | Performed by: INTERNAL MEDICINE

## 2018-11-14 PROCEDURE — 1036F TOBACCO NON-USER: CPT | Performed by: INTERNAL MEDICINE

## 2018-11-16 LAB
CCP IGG ANTIBODIES: 3 UNITS (ref 0–19)
ENA TO SSB (LA) ANTIBODY: 18 AU/ML (ref 0–40)

## 2018-11-16 NOTE — TELEPHONE ENCOUNTER
I guess she will be out for 1 -2 months. This process may have started 20 years ago. She is worsening for unclear etiology. Do I need to print paperwork and change something?

## 2018-11-26 ENCOUNTER — TELEPHONE (OUTPATIENT)
Dept: PULMONOLOGY | Age: 53
End: 2018-11-26

## 2018-11-26 LAB
FUNGUS (MYCOLOGY) CULTURE: NORMAL
FUNGUS STAIN: NORMAL

## 2018-12-11 LAB
AFB CULTURE (MYCOBACTERIA): NORMAL
AFB SMEAR: NORMAL

## 2018-12-15 ENCOUNTER — HOSPITAL ENCOUNTER (OUTPATIENT)
Dept: GENERAL RADIOLOGY | Age: 53
Discharge: HOME | End: 2018-12-15
Payer: COMMERCIAL

## 2018-12-15 ENCOUNTER — HOSPITAL ENCOUNTER (OUTPATIENT)
Age: 53
Discharge: HOME | End: 2018-12-15
Payer: COMMERCIAL

## 2018-12-15 DIAGNOSIS — J84.115 RESPIRATORY BRONCHIOLITIS INTERSTITIAL LUNG DISEASE (HCC): ICD-10-CM

## 2018-12-15 PROCEDURE — 71046 X-RAY EXAM CHEST 2 VIEWS: CPT

## 2019-01-16 ENCOUNTER — OFFICE VISIT (OUTPATIENT)
Dept: PULMONOLOGY | Age: 54
End: 2019-01-16
Payer: COMMERCIAL

## 2019-01-16 VITALS
DIASTOLIC BLOOD PRESSURE: 62 MMHG | RESPIRATION RATE: 16 BRPM | HEART RATE: 66 BPM | WEIGHT: 190 LBS | BODY MASS INDEX: 35.87 KG/M2 | HEIGHT: 61 IN | TEMPERATURE: 98.4 F | OXYGEN SATURATION: 96 % | SYSTOLIC BLOOD PRESSURE: 110 MMHG

## 2019-01-16 DIAGNOSIS — J41.0 SIMPLE CHRONIC BRONCHITIS (HCC): ICD-10-CM

## 2019-01-16 DIAGNOSIS — J98.4 RESTRICTIVE LUNG DISEASE: ICD-10-CM

## 2019-01-16 DIAGNOSIS — G47.10 HYPERSOMNIA: ICD-10-CM

## 2019-01-16 DIAGNOSIS — G47.33 OSA (OBSTRUCTIVE SLEEP APNEA): Primary | ICD-10-CM

## 2019-01-16 DIAGNOSIS — Z23 NEEDS FLU SHOT: ICD-10-CM

## 2019-01-16 PROCEDURE — 99214 OFFICE O/P EST MOD 30 MIN: CPT | Performed by: INTERNAL MEDICINE

## 2019-01-16 PROCEDURE — G8417 CALC BMI ABV UP PARAM F/U: HCPCS | Performed by: INTERNAL MEDICINE

## 2019-01-16 PROCEDURE — 90471 IMMUNIZATION ADMIN: CPT | Performed by: INTERNAL MEDICINE

## 2019-01-16 PROCEDURE — 1036F TOBACCO NON-USER: CPT | Performed by: INTERNAL MEDICINE

## 2019-01-16 PROCEDURE — 3023F SPIROM DOC REV: CPT | Performed by: INTERNAL MEDICINE

## 2019-01-16 PROCEDURE — G8482 FLU IMMUNIZE ORDER/ADMIN: HCPCS | Performed by: INTERNAL MEDICINE

## 2019-01-16 PROCEDURE — 90686 IIV4 VACC NO PRSV 0.5 ML IM: CPT | Performed by: INTERNAL MEDICINE

## 2019-01-16 PROCEDURE — G8926 SPIRO NO PERF OR DOC: HCPCS | Performed by: INTERNAL MEDICINE

## 2019-01-16 PROCEDURE — 3017F COLORECTAL CA SCREEN DOC REV: CPT | Performed by: INTERNAL MEDICINE

## 2019-01-16 PROCEDURE — G8427 DOCREV CUR MEDS BY ELIG CLIN: HCPCS | Performed by: INTERNAL MEDICINE

## 2019-01-16 ASSESSMENT — SLEEP AND FATIGUE QUESTIONNAIRES
HOW LIKELY ARE YOU TO NOD OFF OR FALL ASLEEP WHILE SITTING QUIETLY AFTER LUNCH WITHOUT ALCOHOL: 1
HOW LIKELY ARE YOU TO NOD OFF OR FALL ASLEEP IN A CAR, WHILE STOPPED FOR A FEW MINUTES IN TRAFFIC: 0
HOW LIKELY ARE YOU TO NOD OFF OR FALL ASLEEP WHEN YOU ARE A PASSENGER IN A CAR FOR AN HOUR WITHOUT A BREAK: 1
HOW LIKELY ARE YOU TO NOD OFF OR FALL ASLEEP WHILE LYING DOWN TO REST IN THE AFTERNOON WHEN CIRCUMSTANCES PERMIT: 2
ESS TOTAL SCORE: 9
HOW LIKELY ARE YOU TO NOD OFF OR FALL ASLEEP WHILE SITTING INACTIVE IN A PUBLIC PLACE: 1
HOW LIKELY ARE YOU TO NOD OFF OR FALL ASLEEP WHILE SITTING AND TALKING TO SOMEONE: 0
HOW LIKELY ARE YOU TO NOD OFF OR FALL ASLEEP WHILE WATCHING TV: 2
NECK CIRCUMFERENCE (INCHES): 15.5
HOW LIKELY ARE YOU TO NOD OFF OR FALL ASLEEP WHILE SITTING AND READING: 2

## 2019-02-12 ENCOUNTER — TELEPHONE (OUTPATIENT)
Dept: SLEEP CENTER | Age: 54
End: 2019-02-12

## 2019-02-12 ENCOUNTER — TELEPHONE (OUTPATIENT)
Dept: PULMONOLOGY | Age: 54
End: 2019-02-12

## 2019-03-13 ENCOUNTER — TELEPHONE (OUTPATIENT)
Dept: PULMONOLOGY | Age: 54
End: 2019-03-13

## 2020-01-24 ENCOUNTER — TELEPHONE (OUTPATIENT)
Dept: FAMILY MEDICINE CLINIC | Age: 55
End: 2020-01-24

## 2020-01-24 ENCOUNTER — OFFICE VISIT (OUTPATIENT)
Dept: FAMILY MEDICINE CLINIC | Age: 55
End: 2020-01-24
Payer: COMMERCIAL

## 2020-01-24 VITALS
SYSTOLIC BLOOD PRESSURE: 110 MMHG | BODY MASS INDEX: 37.41 KG/M2 | OXYGEN SATURATION: 96 % | HEART RATE: 78 BPM | TEMPERATURE: 98.2 F | DIASTOLIC BLOOD PRESSURE: 68 MMHG | WEIGHT: 198 LBS

## 2020-01-24 PROBLEM — R25.2 LEG CRAMPING: Status: RESOLVED | Noted: 2018-09-27 | Resolved: 2020-01-24

## 2020-01-24 PROCEDURE — 99213 OFFICE O/P EST LOW 20 MIN: CPT | Performed by: FAMILY MEDICINE

## 2020-01-24 PROCEDURE — G8417 CALC BMI ABV UP PARAM F/U: HCPCS | Performed by: FAMILY MEDICINE

## 2020-01-24 PROCEDURE — G8926 SPIRO NO PERF OR DOC: HCPCS | Performed by: FAMILY MEDICINE

## 2020-01-24 PROCEDURE — G8484 FLU IMMUNIZE NO ADMIN: HCPCS | Performed by: FAMILY MEDICINE

## 2020-01-24 PROCEDURE — 3023F SPIROM DOC REV: CPT | Performed by: FAMILY MEDICINE

## 2020-01-24 PROCEDURE — G8427 DOCREV CUR MEDS BY ELIG CLIN: HCPCS | Performed by: FAMILY MEDICINE

## 2020-01-24 PROCEDURE — 1036F TOBACCO NON-USER: CPT | Performed by: FAMILY MEDICINE

## 2020-01-24 PROCEDURE — 3017F COLORECTAL CA SCREEN DOC REV: CPT | Performed by: FAMILY MEDICINE

## 2020-01-24 RX ORDER — GUAIFENESIN 600 MG/1
600 TABLET, EXTENDED RELEASE ORAL 2 TIMES DAILY
Qty: 30 TABLET | Refills: 0 | Status: SHIPPED | OUTPATIENT
Start: 2020-01-24 | End: 2020-02-08

## 2020-01-24 ASSESSMENT — PATIENT HEALTH QUESTIONNAIRE - PHQ9
2. FEELING DOWN, DEPRESSED OR HOPELESS: 0
SUM OF ALL RESPONSES TO PHQ QUESTIONS 1-9: 0
1. LITTLE INTEREST OR PLEASURE IN DOING THINGS: 0
SUM OF ALL RESPONSES TO PHQ QUESTIONS 1-9: 0
SUM OF ALL RESPONSES TO PHQ9 QUESTIONS 1 & 2: 0

## 2020-01-24 NOTE — PROGRESS NOTES
1/24/2020    This is a 62 y.o. female   Chief Complaint   Patient presents with    Congestion     congestion, chills, bodyaches     HPI   Here for not feeling well  - going on for the past 3 days or so  - feels aches, congestion, chills. No known fever  - no SOB or cough  - she has had some sick contacts at work  - she has tried Tylenol with minimal relief, none yet today. Feels like yesterday was worse than today. - out of Anoro and needs a refill. Albuterol has not changed her symptoms  - received flu shot about 2 weeks ago    Review of Systems  As per HPI, otherwise negative    Patient Active Problem List   Diagnosis    Brain aneurysm - rupture 20 yr ago    History of hepatitis C - tx w interferon, cure    Simple chronic bronchitis (HCC)    Restrictive lung disease    Hypersomnia    Obesity (BMI 30-39. 9)    AMANDA (obstructive sleep apnea)    Restless legs syndrome (RLS)        Past Medical History:   Diagnosis Date    [facility]     Anxiety 4/28/2010    COPD (chronic obstructive pulmonary disease) ([facility] 8/31/2017    History of hepatitis C - tx w interferon, cure 8/31/2017    Migraine     Obesity (BMI 30-39.9) 6/22/2018    Restless legs syndrome (RLS) 9/27/2018    Simple chronic bronchitis ([facility] 8/31/2017    Start Breo 9/2017       Past Surgical History:   Procedure Laterality Date   [place]    BRONCHOSCOPY  10/25/2018    BRONCHOSCOPY ALVEOLAR LAVAGE performed by Elvira Mosqueda MD at [facility] W/TRANSBRONCHIAL LUNG BX 1 LOBE N/A 10/25/2018    BRONCHOSCOPY/TRANSBRONCHIAL BIOPSY WITH FLUOROSCOPY performed by Elvira Mosqueda MD at [address] [place] [address] History     Socioeconomic History    Marital status:      Spouse name: Not on file    Number of children: Not on file    Years of education: Not on file    Highest education level: Not on file   Occupational History    Not on file

## 2020-03-13 ENCOUNTER — HOSPITAL ENCOUNTER (EMERGENCY)
Age: 55
Discharge: HOME | End: 2020-03-13
Attending: EMERGENCY MEDICINE
Payer: COMMERCIAL

## 2020-03-13 ENCOUNTER — APPOINTMENT (OUTPATIENT)
Dept: GENERAL RADIOLOGY | Age: 55
End: 2020-03-13
Payer: COMMERCIAL

## 2020-03-13 ENCOUNTER — TELEPHONE (OUTPATIENT)
Dept: FAMILY MEDICINE CLINIC | Age: 55
End: 2020-03-13

## 2020-03-13 VITALS
HEART RATE: 72 BPM | WEIGHT: 186.95 LBS | OXYGEN SATURATION: 95 % | TEMPERATURE: 98.9 F | HEIGHT: 60 IN | DIASTOLIC BLOOD PRESSURE: 76 MMHG | BODY MASS INDEX: 36.7 KG/M2 | SYSTOLIC BLOOD PRESSURE: 123 MMHG | RESPIRATION RATE: 16 BRPM

## 2020-03-13 LAB
RAPID INFLUENZA  B AGN: NEGATIVE
RAPID INFLUENZA A AGN: NEGATIVE

## 2020-03-13 PROCEDURE — 71045 X-RAY EXAM CHEST 1 VIEW: CPT

## 2020-03-13 PROCEDURE — 99283 EMERGENCY DEPT VISIT LOW MDM: CPT

## 2020-03-13 PROCEDURE — 87804 INFLUENZA ASSAY W/OPTIC: CPT

## 2020-03-13 PROCEDURE — 0100U HC RESPIRPTHGN MULT REV TRANS & AMP PRB TECH 21 TRGT: CPT

## 2020-03-13 ASSESSMENT — PAIN DESCRIPTION - DESCRIPTORS: DESCRIPTORS: ACHING

## 2020-03-13 ASSESSMENT — PAIN DESCRIPTION - LOCATION: LOCATION: GENERALIZED

## 2020-03-13 ASSESSMENT — PAIN SCALES - GENERAL
PAINLEVEL_OUTOF10: 3
PAINLEVEL_OUTOF10: 3

## 2020-03-13 NOTE — ED PROVIDER NOTES
Simple chronic bronchitis ([facility] 8/31/2017    Start Breo 9/2017       SURGICAL HISTORY       Past Surgical History:   Procedure Laterality Date    BRAIN SURGERY  1998    BRONCHOSCOPY  10/25/2018    BRONCHOSCOPY ALVEOLAR LAVAGE [facility] W/TRANSBRONCHIAL LUNG BX 1 LOBE N/A 10/25/2018    BRONCHOSCOPY/TRANSBRONCHIAL BIOPSY WITH FLUOROSCOPY performed by Elvira Mosqueda MD at [facility]       Discharge Medication List as of 3/13/2020  6:54 PM      CONTINUE these medications which have NOT CHANGED    Details   umeclidinium-vilanterol (ANORO ELLIPTA) 62.5-25 MCG/INH AEPB inhaler Inhale 1 puff into the lungs daily, Disp-1 each, R-5Normal      albuterol sulfate HFA (PROAIR HFA) 108 (90 Base) MCG/ACT inhaler Inhale 2 puffs into the lungs every 6 hours as needed for Wheezing, Disp-1 Inhaler, R-3Normal      permethrin (ELIMITE) 5 % cream Apply topically as directed, Disp-2 Tube, R-0, Normal      Multiple Vitamins-Minerals (THERAPEUTIC MULTIVITAMIN-MINERALS) tablet Take 1 tablet by mouth dailyHistorical Med      ferrous sulfate 325 (65 Fe) MG tablet Take 325 mg by mouth daily (with breakfast)Historical Med      vitamin D (CHOLECALCIFEROL) 1000 UNIT TABS tablet Take 1,000 Units by mouth dailyHistorical Med      fluticasone (FLONASE) 50 MCG/ACT nasal spray 2 sprays by Nasal route daily, Disp-1 Bottle, R-0Normal             ALLERGIES     Aspirin    FAMILY HISTORY       Family History   Problem Relation Age of Onset    Alzheimer's Disease Father     Parkinsonism Father     High Blood Pressure Sister     Ovarian Cancer Sister     Cancer Brother     Breast Cancer Neg Hx     Depression Neg Hx         SOCIAL HISTORY       Social History     Socioeconomic History    Marital status:      Spouse name: None    Number of children: None    Years of education: None    Highest rashes  Neurologic: Cranial nerves II through XII was grossly intact. Nonfocal neurological exam  Psychiatric: Patient is pleasant. Mood is appropriate. DIAGNOSTIC RESULTS     EKG (Per Emergency Physician):       RADIOLOGY (Per Emergency Physician): Interpretation per the Radiologist below, if available at the time of this note:  Xr Chest Portable    Result Date: 3/13/2020  EXAMINATION: ONE X-RAY VIEW OF THE CHEST 3/13/2020 4:30 pm COMPARISON: December 15, 2018 HISTORY: ORDERING SYSTEM PROVIDED HISTORY: Flu symptoms TECHNOLOGIST PROVIDED HISTORY: Reason for exam:->Flu symptoms Reason for Exam: Cough, fever, congested, flu-like Acuity: Acute Type of Exam: Initial FINDINGS: No lines or tubes. Stable cardiomediastinal silhouette. Emphysematous changes are present. There is minimal increase in fine reticular opacities since the prior study since 2018. No dense consolidation or pleural effusion. No pneumothorax. No pulmonary edema. No acute osseous abnormality. 1. Minimal increase in reticular opacities since December 2018. Findings are favored to represent progressed chronic lung changes versus differences in technique. Atypical infection is considered less likely but remains a differential consideration. 2. COPD. ED BEDSIDE ULTRASOUND:   Performed by ED Physician - none    LABS:  Labs Reviewed   RAPID INFLUENZA A/B ANTIGENS    Narrative:     Performed at:  [place] [address] [place], [place] [address]   Phone [phone]   RESPIRATORY PANEL, MOLECULAR        All other labs were within normal range or not returned as of this dictation.       Procedures      EMERGENCY DEPARTMENT COURSE and DIFFERENTIAL DIAGNOSIS/MDM:   Vitals:    Vitals:    03/13/20 1700 03/13/20 1715 03/13/20 1730 03/13/20 1745   BP: 101/76 110/76 (!) 115/98 123/76   Pulse:       Resp:       Temp:       TempSrc:       SpO2: 94% 100% 96% 95%   Weight:       Height: Medications - No data to display    MDM. Patient is a 57-year-old no distress with a viral syndrome. X-ray is negative. Flu swab is negative. I suspect a viral syndrome. I am not too concerned about coronavirus. Patient will be discharged home supportive care only  REVAL:         CRITICAL CARE TIME   Total CriticalCare time was 0 minutes, excluding separately reportable procedures. There was a high probability of clinically significant/life threatening deterioration in the patient's condition which required my urgent intervention. CONSULTS:  None    PROCEDURES:  Unless otherwise noted below, none     [unfilled]    FINAL IMPRESSION      1. Viral URI with cough          DISPOSITION/PLAN   DISPOSITION Decision To Discharge 03/13/2020 06:39:26 PM      PATIENT REFERRED TO:  Bryant Coe MD  [place] [address]  [phone]    Schedule an appointment as soon as possible for a visit in 1 week  If symptoms worsen      DISCHARGE MEDICATIONS:  Discharge Medication List as of 3/13/2020  6:54 PM             (Please note:  Portions of this note were completed with a voice recognition program.Efforts were made to edit the dictations but occasionally words and phrases are mis-transcribed.)  Form v2016. J.[id number]    Winston BRUNSON MD (electronically signed)  Emergency Medicine Provider        Kim Carlson MD  03/13/20 1775

## 2020-03-13 NOTE — ED TRIAGE NOTES
pt states she was recently on 8 day cruise came back on 3/7/20, started with fever cough and SOB on 3/11/20. PCP told her to come here. pt states fever was 100.2 took tylenol.

## 2020-03-15 LAB
ORGANISM: ABNORMAL
REPORT: NORMAL
RESPIRATORY PANEL PCR: ABNORMAL

## 2020-03-23 ENCOUNTER — TELEPHONE (OUTPATIENT)
Dept: FAMILY MEDICINE CLINIC | Age: 55
End: 2020-03-23

## 2020-03-31 ENCOUNTER — TELEPHONE (OUTPATIENT)
Dept: FAMILY MEDICINE CLINIC | Age: 55
End: 2020-03-31

## 2020-03-31 NOTE — TELEPHONE ENCOUNTER
Pt called in [facility] faxed over paperwork for STD she is checking the status I do not see anything in media    Please advise

## 2020-04-01 ENCOUNTER — TELEMEDICINE (OUTPATIENT)
Dept: FAMILY MEDICINE CLINIC | Age: 55
End: 2020-04-01
Payer: COMMERCIAL

## 2020-04-01 PROCEDURE — G8427 DOCREV CUR MEDS BY ELIG CLIN: HCPCS | Performed by: FAMILY MEDICINE

## 2020-04-01 PROCEDURE — 99213 OFFICE O/P EST LOW 20 MIN: CPT | Performed by: FAMILY MEDICINE

## 2020-04-01 PROCEDURE — 3017F COLORECTAL CA SCREEN DOC REV: CPT | Performed by: FAMILY MEDICINE

## 2020-04-01 RX ORDER — PREDNISONE 10 MG/1
TABLET ORAL
Qty: 20 TABLET | Refills: 0 | Status: SHIPPED | OUTPATIENT
Start: 2020-04-01 | End: 2021-05-03

## 2020-04-01 NOTE — TELEPHONE ENCOUNTER
I'm sorry but I have yet to receive any papers in my in basket for this pt. Please confirm pt has our correct fax number and maybe she needs to put a 9 in front of it.

## 2020-04-01 NOTE — TELEPHONE ENCOUNTER
Patient calling about [employer] form for std. Not in fax machine up front not sure if this is in back machine? She stated she had them fax over again yesterday   please give patient a call if we need her to fax again.

## 2020-04-01 NOTE — PROGRESS NOTES
WITH FLUOROSCOPY performed by Madison Briones MD at [facility] History     Socioeconomic History    Marital status:      Spouse name: Not on file    Number of children: Not on file    Years of education: Not on file    Highest education level: Not on file   Occupational History    Not on file   Social Needs    Financial resource strain: Not on file    Food insecurity     Worry: Not on file     Inability: Not on file    Transportation needs     Medical: Not on file     Non-medical: Not on file   Tobacco Use    Smoking status: Former Smoker     Packs/day: 2.00     Years: 22.00     Pack years: 44.00     Types: Cigarettes    Smokeless tobacco: Never Used    Tobacco comment: 20 years ago   Substance and Sexual Activity    Alcohol use: No    Drug use: No    Sexual activity: Yes     Partners: Male   Lifestyle    Physical activity     Days per week: Not on file     Minutes per session: Not on file    Stress: Not on file   Relationships    Social connections     Talks on phone: Not on file     Gets together: Not on file     Attends [employer] service: Not on file     Active member of club or organization: Not on file     Attends meetings of clubs or organizations: Not on file     Relationship status: Not on file    Intimate partner violence     Fear of current or ex partner: Not on file     Emotionally abused: Not on file     Physically abused: Not on file     Forced sexual activity: Not on file   Other Topics Concern    Not on file   Social History Narrative    Not on file       Family History   Problem Relation Age of Onset    Alzheimer's Disease Father     Parkinsonism Father     High Blood Pressure Sister     Ovarian Cancer Sister     Cancer Brother     Breast Cancer Neg Hx     Depression Neg Hx        Current Outpatient Medications   Medication Sig Dispense Refill    predniSONE (DELTASONE) 10 MG tablet Take 4 tabs by fatigue. Flu+ 2 weeks ago, discussed likely post-viral fatigue. No red flag symptoms concerning for post-flu PNA. Given her lung disease will send in prednisone. Call for worsening symptoms    Bubba Mcardle is a 60 y.o. female being evaluated by a Virtual Visit (video visit) encounter to address concerns as mentioned above. A caregiver was present when appropriate. Due to this being a TeleHealth encounter (During [place] [address] public health emergency), evaluation of the following organ systems was limited: Vitals/Constitutional/EENT/Resp/CV/GI//MS/Neuro/Skin/Heme-Lymph-Imm. Pursuant to the emergency declaration under the [address] [place] [address] authority and the [employer] Act, this Virtual Visit was conducted with patient's (and/or legal guardian's) consent, to reduce the patient's risk of exposure to COVID-19 and provide necessary medical care. The patient (and/or legal guardian) has also been advised to contact this office for worsening conditions or problems, and seek emergency medical treatment and/or call 911 if deemed necessary. Services were provided through a video synchronous discussion virtually to substitute for in-person clinic visit. Patient and provider were located at their individual homes. --Shanthi Riley MD on 4/3/2020 at 9:15 AM    An electronic signature was used to authenticate this note.

## 2020-08-24 RX ORDER — UMECLIDINIUM BROMIDE AND VILANTEROL TRIFENATATE 62.5; 25 UG/1; UG/1
1 POWDER RESPIRATORY (INHALATION) DAILY
Qty: 1 EACH | Refills: 5 | OUTPATIENT
Start: 2020-08-24

## 2020-08-24 RX ORDER — UMECLIDINIUM BROMIDE AND VILANTEROL TRIFENATATE 62.5; 25 UG/1; UG/1
POWDER RESPIRATORY (INHALATION)
Qty: 60 EACH | Refills: 0 | Status: SHIPPED | OUTPATIENT
Start: 2020-08-24 | End: 2020-10-02

## 2020-10-02 RX ORDER — UMECLIDINIUM BROMIDE AND VILANTEROL TRIFENATATE 62.5; 25 UG/1; UG/1
POWDER RESPIRATORY (INHALATION)
Qty: 60 EACH | Refills: 0 | Status: SHIPPED | OUTPATIENT
Start: 2020-10-02 | End: 2020-10-29

## 2020-10-29 RX ORDER — UMECLIDINIUM BROMIDE AND VILANTEROL TRIFENATATE 62.5; 25 UG/1; UG/1
POWDER RESPIRATORY (INHALATION)
Qty: 60 EACH | Refills: 0 | Status: SHIPPED | OUTPATIENT
Start: 2020-10-29 | End: 2020-12-01

## 2020-11-16 ENCOUNTER — NURSE TRIAGE (OUTPATIENT)
Dept: OTHER | Facility: CLINIC | Age: 55
End: 2020-11-16

## 2020-11-16 NOTE — TELEPHONE ENCOUNTER
no    Protocols used: BREATHING DIFFICULTY-ADULT-[place]    Pt to ED    Attention Provider: Thank you for allowing me to participate in the care of your patient. The patient was connected to triage in response to information provided to the ECC. Please do not respond through this encounter as the response is not directed to a shared pool.

## 2020-11-23 ENCOUNTER — TELEPHONE (OUTPATIENT)
Dept: FAMILY MEDICINE CLINIC | Age: 55
End: 2020-11-23

## 2020-11-23 NOTE — TELEPHONE ENCOUNTER
----- Message from Yan Nunez sent at 11/19/2020  1:00 PM EST -----  Subject: Message to Provider    QUESTIONS  Information for Provider? pt calling in stating she has pneumonia   needs paperwork filled out for work. Wanting to know if she needs an   appointment or if she could bring paperwork to office  ---------------------------------------------------------------------------  --------------  [address]  What is the best way for the office to contact you? OK to leave message on   voicemail  Preferred Call Back Phone Number? [phone]  ---------------------------------------------------------------------------  --------------  SCRIPT ANSWERS  Relationship to Patient?  Self

## 2020-11-23 NOTE — TELEPHONE ENCOUNTER
Pt called in stated we should have her paperwork I see nothing documented please advise she is scheduled at [facility] tomorrow with Dr Pallavi Bravo

## 2020-12-01 RX ORDER — UMECLIDINIUM BROMIDE AND VILANTEROL TRIFENATATE 62.5; 25 UG/1; UG/1
POWDER RESPIRATORY (INHALATION)
Qty: 60 EACH | Refills: 0 | Status: SHIPPED | OUTPATIENT
Start: 2020-12-01 | End: 2020-12-30

## 2020-12-03 ENCOUNTER — OFFICE VISIT (OUTPATIENT)
Dept: PULMONOLOGY | Age: 55
End: 2020-12-03
Payer: COMMERCIAL

## 2020-12-03 VITALS
HEART RATE: 64 BPM | DIASTOLIC BLOOD PRESSURE: 82 MMHG | BODY MASS INDEX: 38.28 KG/M2 | RESPIRATION RATE: 16 BRPM | TEMPERATURE: 98 F | WEIGHT: 195 LBS | OXYGEN SATURATION: 95 % | SYSTOLIC BLOOD PRESSURE: 120 MMHG | HEIGHT: 60 IN

## 2020-12-03 PROCEDURE — 3017F COLORECTAL CA SCREEN DOC REV: CPT | Performed by: INTERNAL MEDICINE

## 2020-12-03 PROCEDURE — 99214 OFFICE O/P EST MOD 30 MIN: CPT | Performed by: INTERNAL MEDICINE

## 2020-12-03 PROCEDURE — G8926 SPIRO NO PERF OR DOC: HCPCS | Performed by: INTERNAL MEDICINE

## 2020-12-03 PROCEDURE — G8482 FLU IMMUNIZE ORDER/ADMIN: HCPCS | Performed by: INTERNAL MEDICINE

## 2020-12-03 PROCEDURE — G8427 DOCREV CUR MEDS BY ELIG CLIN: HCPCS | Performed by: INTERNAL MEDICINE

## 2020-12-03 PROCEDURE — 3023F SPIROM DOC REV: CPT | Performed by: INTERNAL MEDICINE

## 2020-12-03 PROCEDURE — G8417 CALC BMI ABV UP PARAM F/U: HCPCS | Performed by: INTERNAL MEDICINE

## 2020-12-03 PROCEDURE — 1036F TOBACCO NON-USER: CPT | Performed by: INTERNAL MEDICINE

## 2020-12-03 RX ORDER — LEVOFLOXACIN 500 MG/1
500 TABLET, FILM COATED ORAL DAILY
Qty: 5 TABLET | Refills: 0 | Status: SHIPPED | OUTPATIENT
Start: 2020-12-03 | End: 2020-12-08

## 2020-12-03 NOTE — ASSESSMENT & PLAN NOTE
Continue Anoro. It appears she has acute bronchitis at this time. She will be given another round of antibiotics, Levaquin. Side effects discussed.

## 2020-12-03 NOTE — LETTER
[facility]   [address] [facility]. [address] [facility] [place]  Phone: [phone]  Fax: [phone]     12/3/2020    Dr. Mamie Morin MD    I have seen your patient, Radha Solorio on follow up. Here is the assessment and plan for your patient. Any question, please do not hesitate to call. Problem List Items Addressed This Visit     Simple chronic bronchitis (HCC)     Continue Anoro. It appears she has acute bronchitis at this time. She will be given another round of antibiotics, Levaquin. Side effects discussed. Restrictive lung disease     Patient has restrictive lung disease from lung pathology and obesity. She had a CT chest at [address] [place] [address] in the emergency room. While they give her the diagnosis of pneumonia, comparing this CT to 2018, it is actually mildly better. They did not have prior CTs for comparison. With negative bronchoscopy in the past and no signs of progression, no further work-up of this lung pathology at this time. This still may be secondary to a prior inhalational injury.              Other Visit Diagnoses     Acute bronchitis, unspecified organism    -  Primary    Relevant Medications    levoFLOXacin (LEVAQUIN) 500 MG tablet            Sincerely,    [address] [facility]  [phone]

## 2020-12-03 NOTE — PROGRESS NOTES
REASON FOR CONSULTATION/CC:   Chief Complaint   Patient presents with    Pneumonia     f/u pneumonia        Consult at request of  Mamie Morin MD     PCP: Mamie Morin MD    HISTORY OF PRESENT ILLNESS: aRdha Solorio is a 60y.o. year old female with a history of Smoking, brain injury , complicated by pneumonia and tracheostomy who presents             RLS          AMANDA  Has not completed titration        She believes she had pneumonia with shortness of breath, cough with yellow green phlegm with fatigue. Still feeling poor with continued fatigue and cough. Production as improved    She is on prednisone burst for \"aecopd\" and pneumonia. She was treated with doxy and augmentin. chronic bronchitis  Continues on Anoro and albuterol. PAST MEDICAL HISTORY:  Past Medical History:   Diagnosis Date    [facility]     Anxiety 4/28/2010    COPD (chronic obstructive pulmonary disease) ([facility] [address] 8/31/2017    History of hepatitis C - tx w interferon, cure 8/31/2017    Influenza A (H1N1) 03/13/2020    Migraine     Obesity (BMI 30-39.9) 6/22/2018    Restless legs syndrome (RLS) 9/27/2018    Simple chronic bronchitis ([facility] [address] 8/31/2017    Start Breo 9/2017       PAST SURGICAL HISTORY:  Past Surgical History:   Procedure Laterality Date   [address] [place] [address]    BRONCHOSCOPY  10/25/2018    BRONCHOSCOPY ALVEOLAR LAVAGE performed by Leopold Commodore, MD at [facility] W/TRANSBRONCHIAL LUNG BX 1 LOBE N/A 10/25/2018    BRONCHOSCOPY/TRANSBRONCHIAL BIOPSY WITH FLUOROSCOPY performed by Leopold Commodore, MD at [address] [place] [address]:  family history includes Alzheimer's Disease in her father; Cancer in her brother; High Blood Pressure in her sister; Ovarian Cancer in her sister; Parkinsonism in her father. SOCIAL HISTORY:   reports that she has quit smoking. Her smoking use included cigarettes.  She has a 44.00 pack-year MCG/ACT nasal spray 2 sprays by Nasal route daily (Patient not taking: Reported on 12/3/2020) 1 Bottle 0     No current facility-administered medications for this visit. Data Reviewed:   CBC and Renal reviewed  Last CBC  Lab Results   Component Value Date    WBC 8.3 10/03/2018    RBC 4.77 10/03/2018    HGB 14.8 10/03/2018    MCV 93.2 10/03/2018     10/03/2018     Last Renal  Lab Results   Component Value Date     10/03/2018    K 4.4 10/03/2018    K 4.1 04/13/2018     10/03/2018    CO2 26 10/03/2018    CO2 26 04/13/2018    CO2 24 08/31/2017    BUN 13 10/03/2018    CREATININE 0.9 10/03/2018    GLUCOSE 82 10/03/2018    CALCIUM 9.5 10/03/2018       Last ABG  POC Blood Gas: No results found for: POCPH, POCPCO2, POCPO2, POCHCO3, NBEA, [id number]  No results for input(s): PH, PCO2, PO2, HCO3, BE, O2SAT in the last 72 hours. Social History     Tobacco Use   Smoking Status Former Smoker    Packs/day: 2.00    Years: 22.00    Pack years: 44.00    Types: Cigarettes   Smokeless Tobacco Never Used   Tobacco Comment    20 years ago        Assessment:     ·   · Abnormal radiograph  · Snoring  · Hypersomnia  · Restrictive lung disease  · Simple chronic bronchitis      Plan:           Problem List Items Addressed This Visit     Simple chronic bronchitis ([address]     Continue Anoro. It appears she has acute bronchitis at this time. She will be given another round of antibiotics, Levaquin. Side effects discussed. Restrictive lung disease     Patient has restrictive lung disease from lung pathology and obesity. She had a CT chest at [facility] in the emergency room. While they give her the diagnosis of pneumonia, comparing this CT to 2018, it is actually mildly better. They did not have prior CTs for comparison. With negative bronchoscopy in the past and no signs of progression, no further work-up of this lung pathology at this time.   This still may be secondary to a prior inhalational injury. Other Visit Diagnoses     Acute bronchitis, unspecified organism    -  Primary    Relevant Medications    levoFLOXacin (LEVAQUIN) 500 MG tablet        This note was transcribed using [id number] ideeli. Please disregard any translational errors.

## 2020-12-03 NOTE — ASSESSMENT & PLAN NOTE
Patient has restrictive lung disease from lung pathology and obesity. She had a CT chest at [facility] in the emergency room. While they give her the diagnosis of pneumonia, comparing this CT to 2018, it is actually mildly better. They did not have prior CTs for comparison. With negative bronchoscopy in the past and no signs of progression, no further work-up of this lung pathology at this time. This still may be secondary to a prior inhalational injury.

## 2020-12-07 ENCOUNTER — TELEPHONE (OUTPATIENT)
Dept: FAMILY MEDICINE CLINIC | Age: 55
End: 2020-12-07

## 2020-12-09 ENCOUNTER — OFFICE VISIT (OUTPATIENT)
Dept: FAMILY MEDICINE CLINIC | Age: 55
End: 2020-12-09
Payer: COMMERCIAL

## 2020-12-09 VITALS
BODY MASS INDEX: 37.89 KG/M2 | DIASTOLIC BLOOD PRESSURE: 60 MMHG | TEMPERATURE: 97.1 F | SYSTOLIC BLOOD PRESSURE: 100 MMHG | HEART RATE: 81 BPM | WEIGHT: 194 LBS | OXYGEN SATURATION: 99 %

## 2020-12-09 PROBLEM — Z98.890 HISTORY OF TRACHEOSTOMY: Status: ACTIVE | Noted: 2020-12-09

## 2020-12-09 PROCEDURE — 99214 OFFICE O/P EST MOD 30 MIN: CPT | Performed by: FAMILY MEDICINE

## 2020-12-09 PROCEDURE — G8427 DOCREV CUR MEDS BY ELIG CLIN: HCPCS | Performed by: FAMILY MEDICINE

## 2020-12-09 PROCEDURE — G8482 FLU IMMUNIZE ORDER/ADMIN: HCPCS | Performed by: FAMILY MEDICINE

## 2020-12-09 PROCEDURE — 1036F TOBACCO NON-USER: CPT | Performed by: FAMILY MEDICINE

## 2020-12-09 PROCEDURE — 3017F COLORECTAL CA SCREEN DOC REV: CPT | Performed by: FAMILY MEDICINE

## 2020-12-09 PROCEDURE — G8417 CALC BMI ABV UP PARAM F/U: HCPCS | Performed by: FAMILY MEDICINE

## 2020-12-09 NOTE — LETTER
[address] [facility] [address] [facility]  Phone: [phone]  Fax: [phone]    Mehdi Bull MD        December 9, 2020     Patient: Tommie Skinner   YOB: 1965   Date of Visit: 12/9/2020       To Whom it May Concern:    Aditi Almendarez was seen in my clinic on 12/9/2020. She may return to work on 12/14/20. If you have any questions or concerns, please don't hesitate to call.     Sincerely,       Mehdi Bull MD

## 2020-12-09 NOTE — PROGRESS NOTES
12/9/2020    This is a 60 y.o. female   Chief Complaint   Patient presents with   Deyanira Ruth Other     work note to go back to work. fatigued, tightness in chest     HPI    Went to ED on 11/16 for cough and fatigue. Negative COVID test and was dx with PNA, given antibiotics and steroids. - was seen in our [facility] on 11/24 with normal O2 sats  - saw her Pulmonologist 12/3 and was given levaquin for her bronchitis  - note was made that her restrictive lung disease on comparative CTs was stable if not slightly better. She reports feeling about the same. Low energy, fatigue. Mild SOB with exertion. She's been struggling with similar intermittent symptoms for a few years. Normal Stress test 5/2018. No CP  No swelling in legs. No hx of DVT    Hx of brain aneurysm rupture in '98. Had collapsed lungs and required a trach. Cough is stable and chronic. No fever or chills. She is trying to go to work on Monday. 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      Review of Systems   As per HPI, otherwise negative    Past Medical History:   Diagnosis Date    Aneurysm ([address]     Anxiety 4/28/2010    COPD (chronic obstructive pulmonary disease) ([address] 8/31/2017    History of hepatitis C - tx w interferon, cure 8/31/2017    Influenza A (H1N1) 03/13/2020    Migraine     Obesity (BMI 30-39.9) 6/22/2018    Restless legs syndrome (RLS) 9/27/2018    Simple chronic bronchitis ([address] 8/31/2017    Start Breo 9/2017       Past Surgical History:   Procedure Laterality Date   [facility]    BRONCHOSCOPY  10/25/2018    BRONCHOSCOPY ALVEOLAR LAVAGE performed by Elton Aleman MD at [facility] [address] W/TRANSBRONCHIAL LUNG BX 1 LOBE N/A 10/25/2018    BRONCHOSCOPY/TRANSBRONCHIAL BIOPSY WITH FLUOROSCOPY performed by Elton Aleman MD at [facility]         Family History   Problem Relation Age of Onset    Alzheimer's Disease Father     Parkinsonism Father    Deyanira Ruth High Blood Pressure Sister     Ovarian Cancer Sister     Cancer Brother     Breast Cancer Neg Hx     Depression Neg Hx        Current Outpatient Medications   Medication Sig Dispense Refill    ANORO ELLIPTA 62.5-25 MCG/INH AEPB inhaler Inhale 1 puff by mouth once daily 60 each 0    VENTOLIN  (90 Base) MCG/ACT inhaler INHALE 2 PUFFS INTO LUNGS EVERY 6 HOURS AS NEEDED FOR SHORTNESS OF BREATH OR WHEEZING 1 Inhaler 3    Multiple Vitamins-Minerals (THERAPEUTIC MULTIVITAMIN-MINERALS) tablet Take 1 tablet by mouth daily      ferrous sulfate 325 (65 Fe) MG tablet Take 325 mg by mouth daily (with breakfast)      vitamin D (CHOLECALCIFEROL) 1000 UNIT TABS tablet Take 1,000 Units by mouth daily      predniSONE (DELTASONE) 10 MG tablet Take 4 tabs by mouth daily for 2 days, 3 tabs for 2 days, 2 tabs for 2 days, 1 tab for 2 days (Patient not taking: Reported on 12/9/2020) 20 tablet 0    fluticasone (FLONASE) 50 MCG/ACT nasal spray 2 sprays by Nasal route daily (Patient not taking: Reported on 12/3/2020) 1 Bottle 0     No current facility-administered medications for this visit. /60   Pulse 81   Temp 97.1 °F (36.2 °C)   Wt 194 lb (88 kg)   LMP 01/28/2010   SpO2 99%   BMI 37.89 kg/m²     Physical Exam  Constitutional:       Appearance: She is well-developed. HENT:      Head: Normocephalic and atraumatic. Neck:      Musculoskeletal: Normal range of motion. Cardiovascular:      Rate and Rhythm: Normal rate and regular rhythm. Heart sounds: Normal heart sounds. Pulmonary:      Effort: Pulmonary effort is normal.      Breath sounds: Normal breath sounds. Musculoskeletal: Normal range of motion. General: No tenderness. Skin:     General: Skin is warm and dry. Findings: No rash. Neurological:      Mental Status: She is alert and oriented to person, place, and time. Deep Tendon Reflexes: Reflexes are normal and symmetric.          Wt Readings from Last 3 Encounters:

## 2020-12-21 ENCOUNTER — TELEPHONE (OUTPATIENT)
Dept: FAMILY MEDICINE CLINIC | Age: 55
End: 2020-12-21

## 2020-12-21 NOTE — LETTER
[address]  Phone: [phone]  Fax: [phone]    Alisson Foster MD        December 21, 2020     Patient: Alan Saravia   YOB: 1965   Date of Visit: 12/21/2020       To Whom It May Concern: It is my medical opinion that Erick Ormond may return to work on 12/21/2020. If you have any questions or concerns, please don't hesitate to call.     Sincerely,          Alisson Foster MD

## 2020-12-21 NOTE — TELEPHONE ENCOUNTER
Pt called back in regard to letter to check on status.  States she would like letter mailed to her   Please advise

## 2020-12-21 NOTE — TELEPHONE ENCOUNTER
Pt wants letter in the system to have the date saying she can go back to work this week not last week is it ok to change that is all she needs

## 2020-12-21 NOTE — TELEPHONE ENCOUNTER
[id number] Katy Salazar for letter extending time off one more week until today (12/21) due to her pneumonia and subsequent recovery. Thanks.

## 2020-12-21 NOTE — TELEPHONE ENCOUNTER
Pt needs a letter stating she can return to work tomorrow   Pt was out for pneumonia she had a appointment today not sure if she needs a appointment   Pt stated [facility] was supposed to send us paperwork which we have not received gave the pt our fax number

## 2020-12-30 RX ORDER — UMECLIDINIUM BROMIDE AND VILANTEROL TRIFENATATE 62.5; 25 UG/1; UG/1
POWDER RESPIRATORY (INHALATION)
Qty: 60 EACH | Refills: 0 | Status: SHIPPED | OUTPATIENT
Start: 2020-12-30 | End: 2021-01-28

## 2021-01-28 DIAGNOSIS — J41.0 SIMPLE CHRONIC BRONCHITIS (HCC): ICD-10-CM

## 2021-01-28 RX ORDER — UMECLIDINIUM BROMIDE AND VILANTEROL TRIFENATATE 62.5; 25 UG/1; UG/1
POWDER RESPIRATORY (INHALATION)
Qty: 60 EACH | Refills: 0 | Status: SHIPPED | OUTPATIENT
Start: 2021-01-28 | End: 2022-08-01 | Stop reason: SDUPTHER

## 2021-01-29 ENCOUNTER — TELEPHONE (OUTPATIENT)
Dept: FAMILY MEDICINE CLINIC | Age: 56
End: 2021-01-29

## 2021-05-03 ENCOUNTER — OFFICE VISIT (OUTPATIENT)
Dept: PRIMARY CARE CLINIC | Age: 56
End: 2021-05-03
Payer: COMMERCIAL

## 2021-05-03 ENCOUNTER — NURSE TRIAGE (OUTPATIENT)
Dept: OTHER | Facility: CLINIC | Age: 56
End: 2021-05-03

## 2021-05-03 DIAGNOSIS — Z20.822 SUSPECTED COVID-19 VIRUS INFECTION: Primary | ICD-10-CM

## 2021-05-03 PROCEDURE — G8417 CALC BMI ABV UP PARAM F/U: HCPCS | Performed by: NURSE PRACTITIONER

## 2021-05-03 PROCEDURE — G8428 CUR MEDS NOT DOCUMENT: HCPCS | Performed by: NURSE PRACTITIONER

## 2021-05-03 PROCEDURE — 99211 OFF/OP EST MAY X REQ PHY/QHP: CPT | Performed by: NURSE PRACTITIONER

## 2021-05-03 NOTE — TELEPHONE ENCOUNTER
SYMPTOM: \"What is your worst symptom? \" (e.g., cough, fever, shortness of breath, muscle aches)      Fatigue     5. COUGH: \"Do you have a cough? \" If so, ask: \"How bad is the cough? \"        Yes, dry cough     6. FEVER: \"Do you have a fever? \" If so, ask: \"What is your temperature, how was it measured, and when did it start? \"      100.1    7. RESPIRATORY STATUS: \"Describe your breathing? \" (e.g., shortness of breath, wheezing, unable to speak)       Reports always has slight SOB due to COPD but slight worse over the past 4 days. Worse when ambulating    8. BETTER-SAME-WORSE: Acie Seed you getting better, staying the same or getting worse compared to yesterday? \"  If getting worse, ask, \"In what way? \"      Same     9. HIGH RISK DISEASE: \"Do you have any chronic medical problems? \" (e.g., asthma, heart or lung disease, weak immune system, obesity, etc.)      Copd     10. PREGNANCY: \"Is there any chance you are pregnant? \" \"When was your last menstrual period? \"        Menopause     11. OTHER SYMPTOMS: \"Do you have any other symptoms? \"  (e.g., chills, fatigue, headache, loss of smell or taste, muscle pain, sore throat; new loss of smell or taste especially support the diagnosis of COVID-19)        Fatigue, SOB, weakness, cough    Protocols used: CORONAVIRUS (COVID-19) DIAGNOSED OR SUSPECTED-[facility]

## 2021-05-04 LAB — SARS-COV-2: NOT DETECTED

## 2021-07-06 DIAGNOSIS — J41.0 SIMPLE CHRONIC BRONCHITIS (HCC): ICD-10-CM

## 2021-07-06 RX ORDER — ALBUTEROL SULFATE 90 UG/1
2 AEROSOL, METERED RESPIRATORY (INHALATION) EVERY 6 HOURS PRN
Qty: 1 INHALER | Refills: 5 | Status: CANCELLED | OUTPATIENT
Start: 2021-07-06

## 2021-07-06 NOTE — TELEPHONE ENCOUNTER
----- Message from [facility] sent at 7/6/2021  4:04 PM EDT -----  Subject: Refill Request    QUESTIONS  Name of Medication? Other - Albuterol inhaler  Patient-reported dosage and instructions? As needed. How many days do you have left? 0  Preferred Pharmacy? [facility]  Pharmacy phone number (if available)? [phone]  Additional Information for Provider? Patient is wanting to refill her   inhaler but was told by [facility] DR BHARAT KHAN she had to have an appt. since   there weren't any refill's left. She tried to make an appt. with me via   phone call but I had to send a message back to Dr. Chloe Garay to get her in   sooner than 9/10.   ---------------------------------------------------------------------------  --------------  [address]  What is the best way for the office to contact you? OK to leave message on   voicemail  Preferred Call Back Phone Number?  [phone]

## 2021-07-07 ENCOUNTER — TELEPHONE (OUTPATIENT)
Dept: FAMILY MEDICINE CLINIC | Age: 56
End: 2021-07-07

## 2021-07-07 NOTE — TELEPHONE ENCOUNTER
----- Message from [facility] sent at 7/6/2021  4:00 PM EDT -----  Subject: Appointment Request    Reason for Call: Routine Physical Exam    QUESTIONS  Type of Appointment? Established Patient  Reason for appointment request? Requested Provider unavailable - Dr. Georgi Hurtado for Provider? Patient requesting a routine physical   appt. with Dr. Cristian Worthy - no pap. His next available isn't until 9/10 and she   is wanting to know if she can get in any sooner? She is also needing a new   inhaler (put in a refill request for). Would like a call back to schedule   ---------------------------------------------------------------------------  --------------  CALL BACK INFO  What is the best way for the office to contact you? OK to leave message on   voicemail  Preferred Call Back Phone Number? [phone]  ---------------------------------------------------------------------------  --------------  SCRIPT ANSWERS  Relationship to Patient? Self  Appointment reason? Well Care/Follow Ups  Select a Well Care/Follow Ups appointment reason? Adult Physical Exam   [Medicare Annual Wellness, AWV, PAP, Pelvic]  (If the patient has Medicare as their primary insurance coverage ask this   question) Are you requesting a Medicare Annual Wellness Visit? No  (Is the patient requesting a pap smear with their physical exam?)? No  (Is the patient requesting their annual physical and does not need PAP or   AWV per above?)? Yes   Have you been diagnosed with, awaiting test results for, or told that you   are suspected of having COVID-19 (Coronavirus)? (If patient has tested   negative or was tested as a requirement for work, school, or travel and   not based on symptoms, answer no)? No  Do you currently have flu-like symptoms including fever or chills, cough,   shortness of breath, difficulty breathing, or new loss of taste or smell? No  Have you had close contact with someone with COVID-19 in the last 14 days?    No  (Service Expert  click yes below to proceed with Urlist As Usual   Scheduling)?  Yes

## 2021-08-16 ENCOUNTER — TELEPHONE (OUTPATIENT)
Dept: FAMILY MEDICINE CLINIC | Age: 56
End: 2021-08-16

## 2021-08-16 ENCOUNTER — OFFICE VISIT (OUTPATIENT)
Dept: FAMILY MEDICINE CLINIC | Age: 56
End: 2021-08-16
Payer: COMMERCIAL

## 2021-08-16 VITALS
DIASTOLIC BLOOD PRESSURE: 76 MMHG | RESPIRATION RATE: 16 BRPM | HEIGHT: 60 IN | WEIGHT: 197 LBS | OXYGEN SATURATION: 99 % | BODY MASS INDEX: 38.68 KG/M2 | SYSTOLIC BLOOD PRESSURE: 118 MMHG | HEART RATE: 70 BPM

## 2021-08-16 DIAGNOSIS — Z12.31 ENCOUNTER FOR SCREENING MAMMOGRAM FOR BREAST CANCER: ICD-10-CM

## 2021-08-16 DIAGNOSIS — Z12.11 COLON CANCER SCREENING: ICD-10-CM

## 2021-08-16 DIAGNOSIS — Z00.00 ANNUAL PHYSICAL EXAM: Primary | ICD-10-CM

## 2021-08-16 DIAGNOSIS — R94.2 ABNORMAL PFTS (PULMONARY FUNCTION TESTS): ICD-10-CM

## 2021-08-16 PROCEDURE — 99396 PREV VISIT EST AGE 40-64: CPT | Performed by: FAMILY MEDICINE

## 2021-08-16 ASSESSMENT — ENCOUNTER SYMPTOMS
DIARRHEA: 0
SHORTNESS OF BREATH: 1
CONSTIPATION: 0
COUGH: 1
WHEEZING: 1
SORE THROAT: 0

## 2021-08-16 NOTE — TELEPHONE ENCOUNTER
Pt was just in to see Dr. Sarkis Wilkerson today and he ordered her the Ventolin Inhaler but her insurance does not cover that so she needs a different one.     Please Advise

## 2021-08-16 NOTE — PROGRESS NOTES
8/16/2021    This is a 60 y.o. female   Chief Complaint   Patient presents with    Annual Exam     pt states a couple weeks ago she felt really sick and dizzy sleept for a while and then it was gone. that is the only time it happened    . HPI    Dizziness  Patient reports at work in the morning, suddenly felt dizzy. She went home and slept for about 24 hr straight and felt back to normal. She felt like the room was spinning.  - Did have nausea, no vomiting. No fever or chills. No headache at the time but does have history of headaches. - No hearing loss or tinnitus.  - No prior episodes like this. COPD/chronic bronchitis  - Has been wheezing a lot recently, it gets worse in summer.    - Courtney Vivian is too expensive (has not taken for over a year), and Ventolin inhaler prescription ran out (has not had for two months). - Minor cough with a bit of sputum  - Gets around OK without too much SOB   - Follows with Dr. Mar Khanna (Pulmonology), plans to make an appointment soon    Chronic fatigue  - Fatigue has been somewhat improved. She has cut back some hours at work. Health maintenance  - Diet: tries to stay healthy, does enjoy junk food  - Exercise: walking, twice a week or so  - Sleep: sleeps well, \"like a log\"  - Mood: no concerns about mood or mental health  - Smoking: quit 23 years ago at time of brain aneurysm, did smoke up to 2 ppd for about 20 years  - Mammogram: not recently, wants to wait for the \"van\"  - Pap smear: last Pap smear 2015, normal per patient  - Colon cancer screening: Cologuard ordered last time, never arrived. Wants to try again.  - Shingles vaccine: she is interested in receiving it but wants to wait until after her vacation in early September.  - COVID-19 vaccine: received dose #1 of Moderna, 2nd dose delayed due to 's illness, plans to get it after her vacation. Review of Systems   Constitutional: Negative for chills, fatigue, fever and unexpected weight change. HENT: Negative for congestion, hearing loss and sore throat. Eyes: Negative for visual disturbance. Respiratory: Positive for cough, shortness of breath and wheezing. Cardiovascular: Negative for chest pain and palpitations. Gastrointestinal: Negative for constipation and diarrhea. Endocrine: Negative for polydipsia and polyuria. Genitourinary: Negative for difficulty urinating, dysuria, frequency and urgency. Neurological: Positive for dizziness. Negative for light-headedness and headaches. Current Outpatient Medications   Medication Sig Dispense Refill    VENTOLIN  (90 Base) MCG/ACT inhaler INHALE 2 PUFFS INTO LUNGS EVERY 6 HOURS AS NEEDED FOR SHORTNESS OF BREATH OR WHEEZING 1 Inhaler 3    Multiple Vitamins-Minerals (THERAPEUTIC MULTIVITAMIN-MINERALS) tablet Take 1 tablet by mouth daily      ferrous sulfate 325 (65 Fe) MG tablet Take 325 mg by mouth daily (with breakfast)      vitamin D (CHOLECALCIFEROL) 1000 UNIT TABS tablet Take 1,000 Units by mouth daily      ANORO ELLIPTA 62.5-25 MCG/INH AEPB inhaler Inhale 1 puff by mouth once daily (Patient not taking: Reported on 8/16/2021) 60 each 0    fluticasone (FLONASE) 50 MCG/ACT nasal spray 2 sprays by Nasal route daily (Patient not taking: Reported on 12/3/2020) 1 Bottle 0     No current facility-administered medications for this visit. /76 (Site: Right Upper Arm, Position: Sitting, Cuff Size: Large Adult)   Pulse 70   Resp 16   Ht 5' (1.524 m)   Wt 197 lb (89.4 kg)   LMP 01/28/2010   SpO2 99%   BMI 38.47 kg/m²     Physical Exam  Constitutional:       General: She is not in acute distress. Appearance: Normal appearance. She is obese. She is not ill-appearing. HENT:      Head: Normocephalic and atraumatic.       Right Ear: Tympanic membrane, ear canal and external ear normal.      Left Ear: Tympanic membrane, ear canal and external ear normal.      Mouth/Throat:      Mouth: Mucous membranes are moist. Pharynx: No posterior oropharyngeal erythema. Eyes:      Extraocular Movements: Extraocular movements intact. Conjunctiva/sclera: Conjunctivae normal.      Pupils: Pupils are equal, round, and reactive to light. Cardiovascular:      Rate and Rhythm: Normal rate and regular rhythm. Heart sounds: Normal heart sounds. Pulmonary:      Effort: Pulmonary effort is normal.      Breath sounds: Normal breath sounds. No wheezing or rhonchi. Neurological:      Mental Status: She is alert. Wt Readings from Last 3 Encounters:   08/16/21 197 lb (89.4 kg)   05/03/21 203 lb (92.1 kg)   12/09/20 194 lb (88 kg)       BP Readings from Last 3 Encounters:   08/16/21 118/76   12/09/20 100/60   12/03/20 120/82          Assessment/Plan:  1. Annual physical exam  - LIPID PANEL; Future  - BASIC METABOLIC PANEL; Future  - Hemoglobin A1C; Future    2. Colon cancer screening  - Cologuard (For External Results Only); Future    3. Abnormal PFTs (pulmonary function tests)  - VENTOLIN  (90 Base) MCG/ACT inhaler; INHALE 2 PUFFS INTO LUNGS EVERY 6 HOURS AS NEEDED FOR SHORTNESS OF BREATH OR WHEEZING  Dispense: 1 Inhaler; Refill: 3    4. Encounter for screening mammogram for breast cancer  - [facility] DIGITAL SCREEN W OR WO CAD BILATERAL; Future    Discussed routine recommended screenings. Orders as above  Reviewed [employer]. Gave samples since she is out of her controller inhaler. Advised regular routine use of this since noticing improvement of her symptoms while on this. Previously, cost has been an issue    Return in about 3 months (around 11/16/2021) for pap smear.

## 2022-06-10 ENCOUNTER — TELEPHONE (OUTPATIENT)
Dept: OTHER | Facility: CLINIC | Age: 57
End: 2022-06-10

## 2022-06-10 NOTE — TELEPHONE ENCOUNTER
Kalin Carlton, Was contacted today as part of [facility] to schedule a mammogram.         After speaking with patient has not had a recent mammogram.  Reminded patient that they have an open order from  Saturnino Brooks MD and need to contact Central Scheduling to schedule a mammogram.    Spoke w/ pt, declined to schedule. States she prefers to schedule w/ [facility] [place].      Yanci Certain, LPN

## 2022-08-01 ENCOUNTER — OFFICE VISIT (OUTPATIENT)
Dept: FAMILY MEDICINE CLINIC | Age: 57
End: 2022-08-01
Payer: COMMERCIAL

## 2022-08-01 VITALS
HEIGHT: 60 IN | DIASTOLIC BLOOD PRESSURE: 84 MMHG | TEMPERATURE: 98.4 F | BODY MASS INDEX: 38.47 KG/M2 | OXYGEN SATURATION: 98 % | RESPIRATION RATE: 16 BRPM | SYSTOLIC BLOOD PRESSURE: 122 MMHG | HEART RATE: 82 BPM

## 2022-08-01 DIAGNOSIS — J20.9 ACUTE BRONCHITIS, UNSPECIFIED ORGANISM: Primary | ICD-10-CM

## 2022-08-01 DIAGNOSIS — Z12.11 SCREEN FOR COLON CANCER: ICD-10-CM

## 2022-08-01 DIAGNOSIS — J41.0 SIMPLE CHRONIC BRONCHITIS (HCC): ICD-10-CM

## 2022-08-01 DIAGNOSIS — U07.1 COVID-19: ICD-10-CM

## 2022-08-01 PROCEDURE — 99213 OFFICE O/P EST LOW 20 MIN: CPT | Performed by: FAMILY MEDICINE

## 2022-08-01 PROCEDURE — G8427 DOCREV CUR MEDS BY ELIG CLIN: HCPCS | Performed by: FAMILY MEDICINE

## 2022-08-01 PROCEDURE — 3017F COLORECTAL CA SCREEN DOC REV: CPT | Performed by: FAMILY MEDICINE

## 2022-08-01 PROCEDURE — G8417 CALC BMI ABV UP PARAM F/U: HCPCS | Performed by: FAMILY MEDICINE

## 2022-08-01 PROCEDURE — 1036F TOBACCO NON-USER: CPT | Performed by: FAMILY MEDICINE

## 2022-08-01 PROCEDURE — 3023F SPIROM DOC REV: CPT | Performed by: FAMILY MEDICINE

## 2022-08-01 RX ORDER — PREDNISONE 10 MG/1
TABLET ORAL
Qty: 20 TABLET | Refills: 0 | Status: SHIPPED | OUTPATIENT
Start: 2022-08-01 | End: 2022-08-31

## 2022-08-01 RX ORDER — DOXYCYCLINE HYCLATE 100 MG
100 TABLET ORAL 2 TIMES DAILY
Qty: 14 TABLET | Refills: 0 | Status: SHIPPED | OUTPATIENT
Start: 2022-08-01 | End: 2022-08-08

## 2022-08-01 RX ORDER — UMECLIDINIUM BROMIDE AND VILANTEROL TRIFENATATE 62.5; 25 UG/1; UG/1
POWDER RESPIRATORY (INHALATION)
Qty: 60 EACH | Refills: 5 | Status: SHIPPED | OUTPATIENT
Start: 2022-08-01 | End: 2022-08-08

## 2022-08-01 ASSESSMENT — PATIENT HEALTH QUESTIONNAIRE - PHQ9
SUM OF ALL RESPONSES TO PHQ QUESTIONS 1-9: 0
SUM OF ALL RESPONSES TO PHQ9 QUESTIONS 1 & 2: 0
SUM OF ALL RESPONSES TO PHQ QUESTIONS 1-9: 0
2. FEELING DOWN, DEPRESSED OR HOPELESS: 0
1. LITTLE INTEREST OR PLEASURE IN DOING THINGS: 0
SUM OF ALL RESPONSES TO PHQ QUESTIONS 1-9: 0
SUM OF ALL RESPONSES TO PHQ QUESTIONS 1-9: 0

## 2022-08-01 NOTE — PROGRESS NOTES
8/1/2022    This is a 59 y.o. female   Chief Complaint   Patient presents with    Post-COVID Symptoms     Pt states she has had covid since the beginning of the month. She is having a lot of shortness of breathe and a lot of fatigue and tightenss in her chest      HPI    Here for concerns for not feeling well    Symptoms began about a month ago. Tested +for COVID at that time. Had fevers, fatigue, cough. Fevers have subsided, but still has fatigue and cough  - feeling short of breath, wheezing. Notes chest tightness with her breathing  - cough is dry  - sore throat has subsided  - appetite is improving. Taste is not back all the way yet  - has hx of chronic bronchitis with restrictive lung disease, non-progressive    Review of Systems     Current Outpatient Medications   Medication Sig Dispense Refill    doxycycline hyclate (VIBRA-TABS) 100 MG tablet Take 1 tablet by mouth in the morning and 1 tablet before bedtime. Do all this for 7 days.  14 tablet 0    predniSONE (DELTASONE) 10 MG tablet Take 4 tabs by mouth daily for 2 days, 3 tabs for 2 days, 2 tabs for 2 days, 1 tab for 2 days 20 tablet 0    umeclidinium-vilanterol (ANORO ELLIPTA) 62.5-25 MCG/INH AEPB inhaler Inhale 1 puff by mouth once daily 60 each 5    albuterol sulfate  (90 Base) MCG/ACT inhaler INHALE 2 PUFFS BY MOUTH EVERY 6 HOURS AS NEEDED FOR SHORTNESS OF BREATH FOR WHEEZING 9 g 5    tiotropium-olodaterol (STIOLTO RESPIMAT) 2.5-2.5 MCG/ACT AERS Inhale 2 puffs into the lungs daily [id number] EXP 11/01/2021GIVEN BY DR BROWN 2 INHALERS 1 Inhaler 0    Multiple Vitamins-Minerals (THERAPEUTIC MULTIVITAMIN-MINERALS) tablet Take 1 tablet by mouth daily      ferrous sulfate 325 (65 Fe) MG tablet Take 325 mg by mouth daily (with breakfast)      vitamin D (CHOLECALCIFEROL) 1000 UNIT TABS tablet Take 1,000 Units by mouth daily      fluticasone (FLONASE) 50 MCG/ACT nasal spray 2 sprays by Nasal route daily 1 Bottle 0     No current facility-administered medications for this visit. /84 (Site: Right Upper Arm, Position: Sitting, Cuff Size: Large Adult)   Pulse 82   Temp 98.4 °F (36.9 °C) (Oral)   Resp 16   Ht 5' (1.524 m)   LMP 01/28/2010   SpO2 98%   BMI 38.47 kg/m²     Physical Exam  Constitutional:       Appearance: She is well-developed. HENT:      Head: Normocephalic and atraumatic. Right Ear: Tympanic membrane normal.      Left Ear: Tympanic membrane normal.      Nose: Congestion present. Cardiovascular:      Rate and Rhythm: Normal rate and regular rhythm. Heart sounds: Normal heart sounds. Pulmonary:      Effort: Pulmonary effort is normal.      Breath sounds: Normal breath sounds. Musculoskeletal:         General: No tenderness. Normal range of motion. Cervical back: Normal range of motion. Skin:     General: Skin is warm and dry. Findings: No rash. Neurological:      Mental Status: She is alert and oriented to person, place, and time. Deep Tendon Reflexes: Reflexes are normal and symmetric. Wt Readings from Last 3 Encounters:   08/16/21 197 lb (89.4 kg)   05/03/21 203 lb (92.1 kg)   12/09/20 194 lb (88 kg)     BP Readings from Last 3 Encounters:   08/01/22 122/84   08/16/21 118/76   12/09/20 100/60     Assessment/Plan:  1. Acute bronchitis, unspecified organism  - doxycycline hyclate (VIBRA-TABS) 100 MG tablet; Take 1 tablet by mouth in the morning and 1 tablet before bedtime. Do all this for 7 days. Dispense: 14 tablet; Refill: 0  - predniSONE (DELTASONE) 10 MG tablet; Take 4 tabs by mouth daily for 2 days, 3 tabs for 2 days, 2 tabs for 2 days, 1 tab for 2 days  Dispense: 20 tablet; Refill: 0    2. COVID-19    3. Simple chronic bronchitis (HCC)  - umeclidinium-vilanterol (ANORO ELLIPTA) 62.5-25 MCG/INH AEPB inhaler; Inhale 1 puff by mouth once daily  Dispense: 60 each; Refill: 5    4.  Screen for colon cancer  - Fecal DNA Colorectal cancer screening (Cologuard)    She has underlying lung pathology now with prolonged COVID symptoms. High risk for secondary bacterial infection. Treat as above. Work forms will be filled out, her most significant symptom right now is fatigue. Follow-up if not improving in a few weeks    Return if symptoms worsen or fail to improve.       Has been out from work since 6/30/22  Return to work on 8/8/22

## 2022-08-08 ENCOUNTER — TELEPHONE (OUTPATIENT)
Dept: FAMILY MEDICINE CLINIC | Age: 57
End: 2022-08-08

## 2022-08-08 RX ORDER — BUDESONIDE, GLYCOPYRROLATE, AND FORMOTEROL FUMARATE 160; 9; 4.8 UG/1; UG/1; UG/1
2 AEROSOL, METERED RESPIRATORY (INHALATION) 2 TIMES DAILY
Qty: 1 EACH | Refills: 1 | Status: SHIPPED | OUTPATIENT
Start: 2022-08-08

## 2022-08-08 NOTE — TELEPHONE ENCOUNTER
Called [employer] [employer] with discount card it will be $470,  can we give her samples of breztri instead of anoro?

## 2022-08-08 NOTE — TELEPHONE ENCOUNTER
Patient called stating that she is still not feeling well. Patient was here to see Dr. Roya Campbell on   She tested positive for COVID about a month ago, however she is still feeling very tired. She still is feeling achy, SOB and has a headache. When she came into see Dr. Roya Campbell he gave her Prednisone & an antibiotic. She has finished both of those. She did another COVID test this past Saturday & it was Negative    She would like to know what else she can do? She also wanted to let Dr. Roya Campbell know that the coupon he gave her for Anoro (inhaler) was . She also wanted to know if we received anything from [employer]. She said they faxed it over last week.

## 2022-08-08 NOTE — TELEPHONE ENCOUNTER
Called pt and let her know.   She stated she has an appt with dr Daniel Beatty and will see what he wants to do

## 2022-08-08 NOTE — TELEPHONE ENCOUNTER
I am sorry that she is not feeling well. Did she get the anoro inhaler? That would help with the shortness of breath. Sometimes patients can have extended symptoms of shortness of breath and fatigue from having COVID-19. Has she had f/u with her pulmonologist Dr. Kaelyn Sherwood? Please check on her paperwork from [employer]. Dr. Rosa Carter had in his last note for her to return to work today. Did she return to work?

## 2022-08-09 ENCOUNTER — TELEPHONE (OUTPATIENT)
Dept: FAMILY MEDICINE CLINIC | Age: 57
End: 2022-08-09

## 2022-08-09 ENCOUNTER — OFFICE VISIT (OUTPATIENT)
Dept: PULMONOLOGY | Age: 57
End: 2022-08-09
Payer: COMMERCIAL

## 2022-08-09 ENCOUNTER — HOSPITAL ENCOUNTER (OUTPATIENT)
Dept: GENERAL RADIOLOGY | Age: 57
Discharge: HOME | End: 2022-08-09
Payer: COMMERCIAL

## 2022-08-09 ENCOUNTER — HOSPITAL ENCOUNTER (OUTPATIENT)
Age: 57
Discharge: HOME | End: 2022-08-09
Payer: COMMERCIAL

## 2022-08-09 VITALS
OXYGEN SATURATION: 97 % | TEMPERATURE: 97.7 F | HEIGHT: 60 IN | HEART RATE: 96 BPM | WEIGHT: 203.4 LBS | SYSTOLIC BLOOD PRESSURE: 114 MMHG | BODY MASS INDEX: 39.93 KG/M2 | DIASTOLIC BLOOD PRESSURE: 70 MMHG | RESPIRATION RATE: 16 BRPM

## 2022-08-09 DIAGNOSIS — J41.0 SIMPLE CHRONIC BRONCHITIS (HCC): ICD-10-CM

## 2022-08-09 DIAGNOSIS — R94.2 ABNORMAL PFTS (PULMONARY FUNCTION TESTS): ICD-10-CM

## 2022-08-09 DIAGNOSIS — U07.1 COVID: ICD-10-CM

## 2022-08-09 DIAGNOSIS — U07.1 COVID: Primary | ICD-10-CM

## 2022-08-09 PROCEDURE — 71046 X-RAY EXAM CHEST 2 VIEWS: CPT

## 2022-08-09 PROCEDURE — G8427 DOCREV CUR MEDS BY ELIG CLIN: HCPCS | Performed by: INTERNAL MEDICINE

## 2022-08-09 PROCEDURE — G8417 CALC BMI ABV UP PARAM F/U: HCPCS | Performed by: INTERNAL MEDICINE

## 2022-08-09 PROCEDURE — 3023F SPIROM DOC REV: CPT | Performed by: INTERNAL MEDICINE

## 2022-08-09 PROCEDURE — 99214 OFFICE O/P EST MOD 30 MIN: CPT | Performed by: INTERNAL MEDICINE

## 2022-08-09 PROCEDURE — 3017F COLORECTAL CA SCREEN DOC REV: CPT | Performed by: INTERNAL MEDICINE

## 2022-08-09 PROCEDURE — 1036F TOBACCO NON-USER: CPT | Performed by: INTERNAL MEDICINE

## 2022-08-09 RX ORDER — PREDNISONE 10 MG/1
TABLET ORAL
Qty: 30 TABLET | Refills: 0 | Status: SHIPPED | OUTPATIENT
Start: 2022-08-09 | End: 2022-08-19

## 2022-08-09 RX ORDER — LEVOFLOXACIN 750 MG/1
750 TABLET ORAL DAILY
Qty: 7 TABLET | Refills: 0 | Status: SHIPPED | OUTPATIENT
Start: 2022-08-09 | End: 2022-08-16

## 2022-08-09 RX ORDER — BUDESONIDE, GLYCOPYRROLATE, AND FORMOTEROL FUMARATE 160; 9; 4.8 UG/1; UG/1; UG/1
2 AEROSOL, METERED RESPIRATORY (INHALATION) 2 TIMES DAILY
Qty: 1 EACH | Refills: 0 | COMMUNITY
Start: 2022-08-09

## 2022-08-09 RX ORDER — BUDESONIDE, GLYCOPYRROLATE, AND FORMOTEROL FUMARATE 160; 9; 4.8 UG/1; UG/1; UG/1
2 AEROSOL, METERED RESPIRATORY (INHALATION) 2 TIMES DAILY
Qty: 1 EACH | Refills: 11 | Status: SHIPPED | OUTPATIENT
Start: 2022-08-09

## 2022-08-09 RX ORDER — ALBUTEROL SULFATE 90 UG/1
AEROSOL, METERED RESPIRATORY (INHALATION)
Qty: 9 G | Refills: 11 | Status: SHIPPED | OUTPATIENT
Start: 2022-08-09

## 2022-08-09 NOTE — PROGRESS NOTES
REASON FOR CONSULTATION/CC:   Chief Complaint   Patient presents with    Shortness of Breath     Post covid fatigue   can't get up a good deep breath    Follow-up        Consult at request of  Anusha Angel MD     PCP: Anusha Angel MD    HISTORY OF PRESENT ILLNESS: Yodit Zapien is a 59y.o. year old female with a history of Smoking, brain injury , complicated by pneumonia and tracheostomy who presents        History  Patient has a history of an abnormal radiograph with a history of severe pneumonia approximately year 2000 leading to tracheostomy. His CT with tree-in-bud opacity but bronchoscopy in 2018 was negative for ALLISON. Pathology was negative. ABRAHAN and double-stranded DNA was positive with normal sed rate CRP. Ultimately, she did not respond to steroids and the abnormal radiograph was thought to be secondary to her prior severe pneumonia. History      chronic bronchitis      Last seen in 2020 with no issues. Covid in July with continued chest tightness with shortness of breath and fatigue no Non-productive cough no fevers or chills. Did not get treated with covid. She was very sick with covid for at least 10 day. Recent treatment with prednisone and doxy with Anoro with some improved. Objective:   PHYSICAL EXAM:  Blood pressure 114/70, pulse 96, temperature 97.7 °F (36.5 °C), temperature source Infrared, resp. rate 16, height 5' (1.524 m), weight 203 lb 6.4 oz (92.3 kg), last menstrual period 01/28/2010, SpO2 97 %, not currently breastfeeding.'  Gen: No distress. ENT: Well healed trach scar  Resp: No accessory muscle use. No crackles. No wheezes. No rhonchi. CV: Regular rate. Regular rhythm. No murmur or rub. No edema. Skin: Warm, dry, normal texture and turgor. No nodule on exposed extremities. M/S: No cyanosis. No clubbing. No joint deformity. Psych: Oriented x 3. No anxiety. Awake. Alert. Intact judgement and insight. Good Mood / Affect.   Memory appears in tact        Current Outpatient Medications   Medication Sig Dispense Refill    predniSONE (DELTASONE) 10 MG tablet Take 40 mg by mouth for 3 days 30 mg for 3 days 20 mg for 3 days 10 mg for 3 days. 30 tablet 0    levoFLOXacin (LEVAQUIN) 750 MG tablet Take 1 tablet by mouth in the morning for 7 days. 7 tablet 0    Budeson-Glycopyrrol-Formoterol (BREZTRI AEROSPHERE) 160-9-4.8 MCG/ACT AERO Inhale 2 puffs into the lungs in the morning and at bedtime 1 each 0    Budeson-Glycopyrrol-Formoterol (BREZTRI AEROSPHERE) 160-9-4.8 MCG/ACT AERO Inhale 2 puffs into the lungs in the morning and at bedtime 1 each 11    albuterol sulfate HFA (PROVENTIL;VENTOLIN;PROAIR) 108 (90 Base) MCG/ACT inhaler INHALE 2 PUFFS BY MOUTH EVERY 6 HOURS AS NEEDED FOR SHORTNESS OF BREATH FOR WHEEZING 9 g 11    predniSONE (DELTASONE) 10 MG tablet Take 4 tabs by mouth daily for 2 days, 3 tabs for 2 days, 2 tabs for 2 days, 1 tab for 2 days 20 tablet 0    Budeson-Glycopyrrol-Formoterol (BREZTRI AEROSPHERE) 160-9-4.8 MCG/ACT AERO Inhale 2 puffs into the lungs 2 times daily 1 each 1    Multiple Vitamins-Minerals (THERAPEUTIC MULTIVITAMIN-MINERALS) tablet Take 1 tablet by mouth daily      ferrous sulfate 325 (65 Fe) MG tablet Take 325 mg by mouth daily (with breakfast)      vitamin D (CHOLECALCIFEROL) 1000 UNIT TABS tablet Take 1,000 Units by mouth daily      fluticasone (FLONASE) 50 MCG/ACT nasal spray 2 sprays by Nasal route daily 1 Bottle 0     No current facility-administered medications for this visit.        Data Reviewed:   CBC and Renal reviewed  Last CBC  Lab Results   Component Value Date/Time    WBC 8.3 10/03/2018 09:43 AM    RBC 4.77 10/03/2018 09:43 AM    HGB 14.8 10/03/2018 09:43 AM    MCV 93.2 10/03/2018 09:43 AM     10/03/2018 09:43 AM     Last Renal  Lab Results   Component Value Date/Time     08/16/2021 03:37 PM    K 4.3 08/16/2021 03:37 PM    K 4.1 04/13/2018 01:49 PM     08/16/2021 03:37 PM    CO2 24 08/16/2021 03:37 PM CO2 26 10/03/2018 09:43 AM    CO2 26 2018 01:49 PM    BUN 11 2021 03:37 PM    CREATININE 0.7 2021 03:37 PM    GLUCOSE 87 2021 03:37 PM    CALCIUM 9.2 2021 03:37 PM       Last ABG  POC Blood Gas: No results found for: POCPH, POCPCO2, POCPO2, POCHCO3, NBEA, TDIB7CQO  No results for input(s): PH, PCO2, PO2, HCO3, BE, O2SAT in the last 72 hours. Social History     Tobacco Use   Smoking Status Former    Packs/day: 2.00    Years: 22.00    Pack years: 44.00    Types: Cigarettes    Quit date: 2010    Years since quittin.2   Smokeless Tobacco Never   Tobacco Comments    20 years ago        Assessment:       Abnormal radiograph  Snoring with sleep apnea. Not using CPAP. Hypersomnia  Restrictive lung disease  Simple chronic bronchitis      Plan:           Problem List Items Addressed This Visit       Simple chronic bronchitis ([address]      Step up to [facility] with albuterol   Prednisone burst and taper given with round of antibiotics given   Assess further with chest X-ray          Relevant Medications    Budeson-Glycopyrrol-Formoterol (BREZTRI AEROSPHERE) 160-9-4.8 MCG/ACT AERO    Budeson-Glycopyrrol-Formoterol (BREZTRI AEROSPHERE) 160-9-4.8 MCG/ACT AERO    albuterol sulfate HFA (PROVENTIL;VENTOLIN;PROAIR) 108 (90 Base) MCG/ACT inhaler     Other Visit Diagnoses       COVID    -  Primary    Relevant Medications    predniSONE (DELTASONE) 10 MG tablet    levoFLOXacin (LEVAQUIN) 750 MG tablet    Other Relevant Orders    XR CHEST (2 VW) (Completed)    Abnormal PFTs (pulmonary function tests)                This note was transcribed using [id number] [employer]. Please disregard any translational errors.

## 2022-08-09 NOTE — TELEPHONE ENCOUNTER
Patient stopped in to drop off forms for Dr. Schmitz Press to fill out. Forms are in Dr. Tru Reed folder    Please fax to number on form when completed.     Please call patient to let her know when this is completed    Please Advise

## 2022-08-22 ENCOUNTER — TELEPHONE (OUTPATIENT)
Dept: PULMONOLOGY | Age: 57
End: 2022-08-22

## 2022-08-22 NOTE — TELEPHONE ENCOUNTER
Paperwork completed and in fax box. Limited since has not been seen for 2 years and presented mostly as acute visit for recent covid illness.

## 2022-08-22 NOTE — TELEPHONE ENCOUNTER
[employer] sent paper work last week to be filled out by Dr. Manan Mondragon. She was to return to work last  week but is still unable to due to how she is feeling very fatigued she plans to return 8/29. Please fill out paperwork as soon as possible and fax back, please ask Dr. Manan Mondragon to come over to sign this week if he is ok with the extension ?

## 2022-08-22 NOTE — TELEPHONE ENCOUNTER
Patient will need to complete part of the paperwork. SW patient and she will be here to sign and fill out this week. Make sure to copy/scan to chart if patient takes the paperwork.

## 2022-08-26 NOTE — TELEPHONE ENCOUNTER
Physician Documentation                                                                           

 [facility]                                                                 

Name: Justyna Barroso                                                                               

Age: 22 yrs                                                                                       

Sex: Female                                                                                       

: 2000                                                                                   

MRN: [id number]                                                                                   

Arrival Date: 2022                                                                          

Time: 11:40                                                                                       

Account#: [id number]                                                                            

Bed 19                                                                                            

Private MD:                                                                                       

ED Physician Rohith Grigsby                                                                       

OB/GYN:                                                                                           

                                                                                             

[facility] 

                                                                                                  

                                                                                             

12:43 Order name: CBC with Diff; Complete Time: 14:04                                         [facility] 

                                                                                             

12:43 Order name: Comprehensive Metabolic Panel; Complete Time: 14:04                         [facility] 

                                                                                             

12:43 Order name: Strep; Complete Time: 14:04                                                 [facility] 

                                                                                             

13:47 Order name: Throat Culture                                                              [facility]

                                                                                             

15:55 Order name: Urine Dipstick-Ancillary; Complete Time: 16:31                              [facility]

                                                                                             

15:57 Order name: Urine Pregnancy--Ancillary (enter results); Complete Time: 16:31            [facility] 

                                                                                             

Rohith Ortiz MD MD   kdr                                                  

Francie Denis, RN                     RN   [id number]                                                   

Aster Wyman RN                     RN   [id number]                                                  

Mike Lu RN   [id number]                                                  

                                                                                                  

************************************************************************************************** Tried both numbers x4  Sounded like someone answered the mobile number and after explaining it was the doctor's office she kept hanging up  Will try later

## 2022-08-29 LAB — NONINV COLON CA DNA+OCC BLD SCRN STL QL: NEGATIVE

## 2022-08-31 ENCOUNTER — OFFICE VISIT (OUTPATIENT)
Dept: FAMILY MEDICINE CLINIC | Age: 57
End: 2022-08-31
Payer: COMMERCIAL

## 2022-08-31 ENCOUNTER — TELEPHONE (OUTPATIENT)
Dept: FAMILY MEDICINE CLINIC | Age: 57
End: 2022-08-31

## 2022-08-31 VITALS
SYSTOLIC BLOOD PRESSURE: 132 MMHG | HEART RATE: 85 BPM | RESPIRATION RATE: 16 BRPM | DIASTOLIC BLOOD PRESSURE: 72 MMHG | TEMPERATURE: 98.3 F | WEIGHT: 203 LBS | BODY MASS INDEX: 39.85 KG/M2 | HEIGHT: 60 IN | OXYGEN SATURATION: 95 %

## 2022-08-31 DIAGNOSIS — F41.9 ANXIETY: ICD-10-CM

## 2022-08-31 DIAGNOSIS — R53.83 FATIGUE, UNSPECIFIED TYPE: Primary | ICD-10-CM

## 2022-08-31 DIAGNOSIS — R93.89 ABNORMAL CHEST X-RAY: ICD-10-CM

## 2022-08-31 DIAGNOSIS — U09.9 POST-COVID CHRONIC FATIGUE: ICD-10-CM

## 2022-08-31 DIAGNOSIS — G93.32 POST-COVID CHRONIC FATIGUE: ICD-10-CM

## 2022-08-31 DIAGNOSIS — R06.02 SHORTNESS OF BREATH: ICD-10-CM

## 2022-08-31 DIAGNOSIS — R53.83 FATIGUE, UNSPECIFIED TYPE: ICD-10-CM

## 2022-08-31 LAB
A/G RATIO: 1.4 (ref 1.1–2.2)
ALBUMIN SERPL-MCNC: 4.3 G/DL (ref 3.4–5)
ALP BLD-CCNC: 66 U/L (ref 40–129)
ALT SERPL-CCNC: 25 U/L (ref 10–40)
ANION GAP SERPL CALCULATED.3IONS-SCNC: 12 MMOL/L (ref 3–16)
AST SERPL-CCNC: 23 U/L (ref 15–37)
BASOPHILS ABSOLUTE: 0 K/UL (ref 0–0.2)
BASOPHILS RELATIVE PERCENT: 0.5 %
BILIRUB SERPL-MCNC: <0.2 MG/DL (ref 0–1)
BUN BLDV-MCNC: 13 MG/DL (ref 7–20)
CALCIUM SERPL-MCNC: 9.6 MG/DL (ref 8.3–10.6)
CHLORIDE BLD-SCNC: 111 MMOL/L (ref 99–110)
CO2: 24 MMOL/L (ref 21–32)
CREAT SERPL-MCNC: 0.9 MG/DL (ref 0.6–1.1)
EOSINOPHILS ABSOLUTE: 0.3 K/UL (ref 0–0.6)
EOSINOPHILS RELATIVE PERCENT: 3.8 %
FERRITIN: 243.9 NG/ML (ref 15–150)
GFR AFRICAN AMERICAN: >60
GFR NON-AFRICAN AMERICAN: >60
GLUCOSE BLD-MCNC: 114 MG/DL (ref 70–99)
HCT VFR BLD CALC: 41.9 % (ref 36–48)
HEMOGLOBIN: 14.3 G/DL (ref 12–16)
LYMPHOCYTES ABSOLUTE: 2.3 K/UL (ref 1–5.1)
LYMPHOCYTES RELATIVE PERCENT: 34.5 %
MCH RBC QN AUTO: 31.9 PG (ref 26–34)
MCHC RBC AUTO-ENTMCNC: 34.1 G/DL (ref 31–36)
MCV RBC AUTO: 93.6 FL (ref 80–100)
MONOCYTES ABSOLUTE: 0.7 K/UL (ref 0–1.3)
MONOCYTES RELATIVE PERCENT: 10.2 %
NEUTROPHILS ABSOLUTE: 3.4 K/UL (ref 1.7–7.7)
NEUTROPHILS RELATIVE PERCENT: 51 %
PDW BLD-RTO: 14.1 % (ref 12.4–15.4)
PLATELET # BLD: 216 K/UL (ref 135–450)
PMV BLD AUTO: 9.5 FL (ref 5–10.5)
POTASSIUM SERPL-SCNC: 4.1 MMOL/L (ref 3.5–5.1)
RBC # BLD: 4.47 M/UL (ref 4–5.2)
SODIUM BLD-SCNC: 147 MMOL/L (ref 136–145)
TOTAL PROTEIN: 7.3 G/DL (ref 6.4–8.2)
TSH REFLEX FT4: 0.83 UIU/ML (ref 0.27–4.2)
WBC # BLD: 6.6 K/UL (ref 4–11)

## 2022-08-31 PROCEDURE — 3017F COLORECTAL CA SCREEN DOC REV: CPT | Performed by: FAMILY MEDICINE

## 2022-08-31 PROCEDURE — G8417 CALC BMI ABV UP PARAM F/U: HCPCS | Performed by: FAMILY MEDICINE

## 2022-08-31 PROCEDURE — G8427 DOCREV CUR MEDS BY ELIG CLIN: HCPCS | Performed by: FAMILY MEDICINE

## 2022-08-31 PROCEDURE — 1036F TOBACCO NON-USER: CPT | Performed by: FAMILY MEDICINE

## 2022-08-31 PROCEDURE — 99214 OFFICE O/P EST MOD 30 MIN: CPT | Performed by: FAMILY MEDICINE

## 2022-08-31 RX ORDER — LORAZEPAM 0.5 MG/1
0.5 TABLET ORAL EVERY 12 HOURS PRN
Qty: 30 TABLET | Refills: 0 | Status: SHIPPED | OUTPATIENT
Start: 2022-08-31 | End: 2022-09-15

## 2022-08-31 SDOH — ECONOMIC STABILITY: FOOD INSECURITY: WITHIN THE PAST 12 MONTHS, THE FOOD YOU BOUGHT JUST DIDN'T LAST AND YOU DIDN'T HAVE MONEY TO GET MORE.: NEVER TRUE

## 2022-08-31 SDOH — ECONOMIC STABILITY: FOOD INSECURITY: WITHIN THE PAST 12 MONTHS, YOU WORRIED THAT YOUR FOOD WOULD RUN OUT BEFORE YOU GOT MONEY TO BUY MORE.: NEVER TRUE

## 2022-08-31 ASSESSMENT — SOCIAL DETERMINANTS OF HEALTH (SDOH): HOW HARD IS IT FOR YOU TO PAY FOR THE VERY BASICS LIKE FOOD, HOUSING, MEDICAL CARE, AND HEATING?: NOT HARD AT ALL

## 2022-08-31 NOTE — PROGRESS NOTES
8/31/2022    This is a 59 y.o. female   Chief Complaint   Patient presents with    Post-COVID Symptoms     Had covid in June and still having sob, fatigue, palpitations. Cough. Everything feels heavy     HPI     Here for concerns for not feeling well  - had COVID June 30th, 2 months ago. Malone acutely ill for 2 weeks. Reports fatigue has persisted the whole time. Feels like her breathing since that time has been fast and shallow. Covered for bronchitis with doxy and prednisone on 8/1 (4 weeks ago). Seemed to help just a little bit. - saw her Pulmonologist, treated with prednisone and levo, inhaler stepped up to [facility]  - notes sensation of dry cough during this time as well    Getting 9 hours of sleep. Wakes up not feeling rested. Limbs feel heavy. Is able to do her dishes and then has to go sit down. Before 2 months ago, this would not have tired her out  - cannot take more than a few steps without feeling short of breath  - has not been able to go back to work due to the fatigue    Reports increase in anxiety with her breathing changes    Review of Systems     Current Outpatient Medications   Medication Sig Dispense Refill    LORazepam (ATIVAN) 0.5 MG tablet Take 1 tablet by mouth every 12 hours as needed for Anxiety for up to 15 days.  30 tablet 0    Budeson-Glycopyrrol-Formoterol (BREZTRI AEROSPHERE) 160-9-4.8 MCG/ACT AERO Inhale 2 puffs into the lungs in the morning and at bedtime 1 each 0    Budeson-Glycopyrrol-Formoterol (BREZTRI AEROSPHERE) 160-9-4.8 MCG/ACT AERO Inhale 2 puffs into the lungs in the morning and at bedtime 1 each 11    albuterol sulfate HFA (PROVENTIL;VENTOLIN;PROAIR) 108 (90 Base) MCG/ACT inhaler INHALE 2 PUFFS BY MOUTH EVERY 6 HOURS AS NEEDED FOR SHORTNESS OF BREATH FOR WHEEZING 9 g 11    Budeson-Glycopyrrol-Formoterol (BREZTRI AEROSPHERE) 160-9-4.8 MCG/ACT AERO Inhale 2 puffs into the lungs 2 times daily 1 each 1    Multiple Vitamins-Minerals (THERAPEUTIC MULTIVITAMIN-MINERALS) tablet Take 1 tablet by mouth daily      ferrous sulfate 325 (65 Fe) MG tablet Take 325 mg by mouth daily (with breakfast)      vitamin D (CHOLECALCIFEROL) 1000 UNIT TABS tablet Take 1,000 Units by mouth daily      fluticasone (FLONASE) 50 MCG/ACT nasal spray 2 sprays by Nasal route daily (Patient not taking: Reported on 8/31/2022) 1 Bottle 0     No current facility-administered medications for this visit. /72 (Site: Right Upper Arm, Position: Sitting, Cuff Size: Medium Adult)   Pulse 85   Temp 98.3 °F (36.8 °C) (Oral)   Resp 16   Ht 5' (1.524 m)   Wt 203 lb (92.1 kg)   LMP 01/28/2010   SpO2 95%   BMI 39.65 kg/m²     Physical Exam  Constitutional:       Appearance: She is well-developed. HENT:      Head: Normocephalic and atraumatic. Cardiovascular:      Rate and Rhythm: Normal rate and regular rhythm. Pulmonary:      Effort: Pulmonary effort is normal.      Comments: Short, quick breaths with prolonged expiratory phase  Skin:     Coloration: Skin is not pale. Neurological:      Mental Status: She is alert and oriented to person, place, and time. Wt Readings from Last 3 Encounters:   08/31/22 203 lb (92.1 kg)   08/09/22 203 lb 6.4 oz (92.3 kg)   08/16/21 197 lb (89.4 kg)     BP Readings from Last 3 Encounters:   08/31/22 132/72   08/09/22 114/70   08/01/22 122/84     Assessment/Plan:  1. Fatigue, unspecified type  - CT CHEST WO CONTRAST; Future  - Ferritin; Future  - TSH with Reflex to FT4; Future  - CBC with Auto Differential; Future  - Comprehensive Metabolic Panel; Future    2. Post-COVID chronic fatigue    3. Shortness of breath  - CT CHEST WO CONTRAST; Future    4. Abnormal chest x-ray  - CT CHEST WO CONTRAST; Future    5. Anxiety  - LORazepam (ATIVAN) 0.5 MG tablet; Take 1 tablet by mouth every 12 hours as needed for Anxiety for up to 15 days. Dispense: 30 tablet; Refill: 0    Significant fatigue with shortness of breath since having COVID for the past 2 months.   It is impacting her ability to function and she is not able to work. Check labs as above to rule out other potential secondary causes  Check lung CT to rule out other progressive lung pathology  Continue current inhaler medication    Trial low-dose Ativan to rule out any component of anxiety that may be contributing to the shortness of breath.   We reviewed potential side effects of this medication, that we will not be using this long-term, and potential for dependence or abuse

## 2022-09-12 ENCOUNTER — OFFICE VISIT (OUTPATIENT)
Dept: PULMONOLOGY | Age: 57
End: 2022-09-12
Payer: COMMERCIAL

## 2022-09-12 VITALS
TEMPERATURE: 98.7 F | BODY MASS INDEX: 39.66 KG/M2 | RESPIRATION RATE: 21 BRPM | HEART RATE: 84 BPM | HEIGHT: 60 IN | WEIGHT: 202 LBS | DIASTOLIC BLOOD PRESSURE: 70 MMHG | OXYGEN SATURATION: 98 % | SYSTOLIC BLOOD PRESSURE: 110 MMHG

## 2022-09-12 DIAGNOSIS — R06.02 SOB (SHORTNESS OF BREATH): Primary | ICD-10-CM

## 2022-09-12 DIAGNOSIS — J98.4 RESTRICTIVE LUNG DISEASE: ICD-10-CM

## 2022-09-12 DIAGNOSIS — E66.9 OBESITY (BMI 30-39.9): ICD-10-CM

## 2022-09-12 PROCEDURE — 3017F COLORECTAL CA SCREEN DOC REV: CPT | Performed by: INTERNAL MEDICINE

## 2022-09-12 PROCEDURE — 1036F TOBACCO NON-USER: CPT | Performed by: INTERNAL MEDICINE

## 2022-09-12 PROCEDURE — 99214 OFFICE O/P EST MOD 30 MIN: CPT | Performed by: INTERNAL MEDICINE

## 2022-09-12 PROCEDURE — G8417 CALC BMI ABV UP PARAM F/U: HCPCS | Performed by: INTERNAL MEDICINE

## 2022-09-12 PROCEDURE — G8427 DOCREV CUR MEDS BY ELIG CLIN: HCPCS | Performed by: INTERNAL MEDICINE

## 2022-09-12 NOTE — PROGRESS NOTES
REASON FOR CONSULTATION/CC:   Chief Complaint   Patient presents with    Follow-up    Shortness of Breath     Post Covid         Consult at request of  Alexandra Damon MD     PCP: Alexandra Damon MD    HISTORY OF PRESENT ILLNESS: Thee Del Toro is a 59y.o. year old female with a history of Smoking, brain injury , complicated by pneumonia and tracheostomy who presents        History  Patient has a history of an abnormal radiograph with a history of severe pneumonia approximately year 2000 leading to tracheostomy. His CT with tree-in-bud opacity but bronchoscopy in 2018 was negative for ALLISON. Pathology was negative. ABRAHAN and double-stranded DNA was positive with normal sed rate CRP. Ultimately, she did not respond to steroids and the abnormal radiograph was thought to be secondary to her prior severe pneumonia. She had covid in July 2022 with persistent shortness of breath after this point. History       chronic bronchitis  Breztri was $ 400. shortness of breath improvin. follow up CT chest .       Weight                  Objective:   PHYSICAL EXAM:  Blood pressure 110/70, pulse 84, temperature 98.7 °F (37.1 °C), resp. rate 21, height 5' (1.524 m), weight 202 lb (91.6 kg), last menstrual period 01/28/2010, SpO2 98 %, not currently breastfeeding.'  Gen: No distress. ENT: Well healed trach scar  Resp: No accessory muscle use. No crackles. No wheezes. No rhonchi. CV: Regular rate. Regular rhythm. No murmur or rub. No edema. Skin: Warm, dry, normal texture and turgor. No nodule on exposed extremities. M/S: No cyanosis. No clubbing. No joint deformity. Psych: Oriented x 3. No anxiety. Awake. Alert. Intact judgement and insight. Good Mood / Affect. Memory appears in tact      Data Reviewed:       Assessment:       Abnormal radiograph  Snoring with sleep apnea. Not using CPAP.   Hypersomnia  Restrictive lung disease  Simple chronic bronchitis      Plan:           Problem List Items Addressed This Visit       Restrictive lung disease     Slowly improving from last covid infection on top of prior infection. She was changed from Anoro to Comiso secondary to effect and cost.  Comiso was not over either. Will try Bevespi. She has a follow up CT chest in ~ 2 weeks. Last chest X-ray discussed mild cardiomyopathy. This was not seen 2018. Will order echo to further assess. Relevant Medications    glycopyrrolate-formoterol (BEVESPI AEROSPHERE) 9-4.8 MCG/ACT AERO    Obesity (BMI 30-39. 9)     Again discussed calorie restriction           Other Visit Diagnoses       SOB (shortness of breath)    -  Primary    Relevant Orders    ECHO Complete 2D W Doppler W Color              This note was transcribed using [id number] Stamped. Please disregard any translational errors.

## 2022-09-12 NOTE — ASSESSMENT & PLAN NOTE
Slowly improving from last covid infection on top of prior infection. She was changed from Anoro to Comiso secondary to effect and cost.  Comiso was not over either. Will try Bevespi. She has a follow up CT chest in ~ 2 weeks. Last chest X-ray discussed mild cardiomyopathy. This was not seen 2018. Will order echo to further assess.

## 2022-09-13 ENCOUNTER — TELEPHONE (OUTPATIENT)
Dept: FAMILY MEDICINE CLINIC | Age: 57
End: 2022-09-13

## 2022-09-13 NOTE — TELEPHONE ENCOUNTER
----- Message from Bina Brady sent at 9/13/2022 11:14 AM EDT -----  Subject: Message to Provider    QUESTIONS  Information for Provider? [facility] needs copies of the patient's [facility] visit   notes. Requested but did not receive. Fax to [phone].  Any   questions, [phone], ext [phone] Mohan Gomez)  ---------------------------------------------------------------------------  --------------  Warden Juliane CAMARGO  [phone]; OK to leave message on voicemail  ---------------------------------------------------------------------------  --------------  SCRIPT ANSWERS  undefined

## 2022-09-21 ENCOUNTER — HOSPITAL ENCOUNTER (OUTPATIENT)
Dept: CT IMAGING | Age: 57
Discharge: HOME | End: 2022-09-21
Payer: COMMERCIAL

## 2022-09-21 DIAGNOSIS — R06.02 SHORTNESS OF BREATH: ICD-10-CM

## 2022-09-21 DIAGNOSIS — R53.83 FATIGUE, UNSPECIFIED TYPE: ICD-10-CM

## 2022-09-21 DIAGNOSIS — R93.89 ABNORMAL CHEST X-RAY: ICD-10-CM

## 2022-09-21 PROCEDURE — 71250 CT THORAX DX C-: CPT

## 2022-09-28 ENCOUNTER — OFFICE VISIT (OUTPATIENT)
Dept: FAMILY MEDICINE CLINIC | Age: 57
End: 2022-09-28
Payer: COMMERCIAL

## 2022-09-28 ENCOUNTER — TELEPHONE (OUTPATIENT)
Dept: CARDIOLOGY CLINIC | Age: 57
End: 2022-09-28

## 2022-09-28 ENCOUNTER — NURSE ONLY (OUTPATIENT)
Dept: CARDIOLOGY CLINIC | Age: 57
End: 2022-09-28

## 2022-09-28 VITALS
RESPIRATION RATE: 16 BRPM | BODY MASS INDEX: 40.25 KG/M2 | WEIGHT: 205 LBS | DIASTOLIC BLOOD PRESSURE: 68 MMHG | HEART RATE: 77 BPM | SYSTOLIC BLOOD PRESSURE: 110 MMHG | HEIGHT: 60 IN | OXYGEN SATURATION: 98 %

## 2022-09-28 DIAGNOSIS — R00.2 PALPITATIONS: ICD-10-CM

## 2022-09-28 DIAGNOSIS — R53.83 FATIGUE, UNSPECIFIED TYPE: ICD-10-CM

## 2022-09-28 DIAGNOSIS — F41.9 ANXIETY: ICD-10-CM

## 2022-09-28 DIAGNOSIS — U09.9 POST-COVID CHRONIC FATIGUE: ICD-10-CM

## 2022-09-28 DIAGNOSIS — G93.32 POST-COVID CHRONIC FATIGUE: ICD-10-CM

## 2022-09-28 DIAGNOSIS — R06.02 SHORTNESS OF BREATH: Primary | ICD-10-CM

## 2022-09-28 PROCEDURE — 1036F TOBACCO NON-USER: CPT | Performed by: FAMILY MEDICINE

## 2022-09-28 PROCEDURE — G8427 DOCREV CUR MEDS BY ELIG CLIN: HCPCS | Performed by: FAMILY MEDICINE

## 2022-09-28 PROCEDURE — 3017F COLORECTAL CA SCREEN DOC REV: CPT | Performed by: FAMILY MEDICINE

## 2022-09-28 PROCEDURE — 99214 OFFICE O/P EST MOD 30 MIN: CPT | Performed by: FAMILY MEDICINE

## 2022-09-28 PROCEDURE — G8417 CALC BMI ABV UP PARAM F/U: HCPCS | Performed by: FAMILY MEDICINE

## 2022-09-28 RX ORDER — LORAZEPAM 0.5 MG/1
0.5 TABLET ORAL DAILY PRN
Qty: 20 TABLET | Refills: 0 | Status: SHIPPED | OUTPATIENT
Start: 2022-09-28 | End: 2022-11-03 | Stop reason: SDUPTHER

## 2022-09-28 NOTE — PROGRESS NOTES
9/28/2022    This is a 59 y.o. female   Chief Complaint   Patient presents with    Fatigue     Patient is here for a 4 week follow up      HPI    Here for follow up  - had COVID June 30th, 3 months ago. [place] acutely ill for 2 weeks. Reports fatigue has persisted the whole time. Feels like her breathing since that time has been fast and shallow. Covered for bronchitis with doxy and prednisone on 8/1 (2 months ago). Seemed to help just a little bit. - saw her Pulmonologist, treated with prednisone and levo, inhaler stepped up to [place]  - notes sensation of dry cough during this time as well    Lab testing last month included normal CBC and TSH, normal renal and hepatic function     Still has persistent fatigue. Feels short of breath even at rest. Notes palpitations that are worse when lying down at night, typically lasts for a few minutes and then gets better    Sleeping a lot but doesn't feel rested    No significant symptom changes or improvement over the past month. Uses ativan PRN which helps slow her breathing down and calm her nerves    Review of Systems     Current Outpatient Medications   Medication Sig Dispense Refill    LORazepam (ATIVAN) 0.5 MG tablet Take 1 tablet by mouth daily as needed for Anxiety for up to 30 days.  20 tablet 0    glycopyrrolate-formoterol (BEVESPI AEROSPHERE) 9-4.8 MCG/ACT AERO Inhale 2 puffs into the lungs 2 times daily 1 each 11    Budeson-Glycopyrrol-Formoterol (BREZTRI AEROSPHERE) 160-9-4.8 MCG/ACT AERO Inhale 2 puffs into the lungs in the morning and at bedtime 1 each 0    Budeson-Glycopyrrol-Formoterol (BREZTRI AEROSPHERE) 160-9-4.8 MCG/ACT AERO Inhale 2 puffs into the lungs in the morning and at bedtime 1 each 11    albuterol sulfate HFA (PROVENTIL;VENTOLIN;PROAIR) 108 (90 Base) MCG/ACT inhaler INHALE 2 PUFFS BY MOUTH EVERY 6 HOURS AS NEEDED FOR SHORTNESS OF BREATH FOR WHEEZING 9 g 11    Budeson-Glycopyrrol-Formoterol (BREZTRI AEROSPHERE) 160-9-4.8 MCG/ACT AERO Inhale 2 puffs into the lungs 2 times daily 1 each 1    Multiple Vitamins-Minerals (THERAPEUTIC MULTIVITAMIN-MINERALS) tablet Take 1 tablet by mouth daily      ferrous sulfate 325 (65 Fe) MG tablet Take 325 mg by mouth daily (with breakfast)      vitamin D (CHOLECALCIFEROL) 1000 UNIT TABS tablet Take 1,000 Units by mouth daily      fluticasone (FLONASE) 50 MCG/ACT nasal spray 2 sprays by Nasal route daily 1 Bottle 0     No current facility-administered medications for this visit. /68 (Site: Right Upper Arm, Position: Sitting, Cuff Size: Large Adult)   Pulse 77   Resp 16   Ht 5' (1.524 m)   Wt 205 lb (93 kg)   LMP 01/28/2010   SpO2 98%   BMI 40.04 kg/m²     Physical Exam  Constitutional:       Appearance: She is well-developed. HENT:      Head: Normocephalic and atraumatic. Cardiovascular:      Rate and Rhythm: Normal rate and regular rhythm. Heart sounds: Normal heart sounds. Pulmonary:      Effort: Pulmonary effort is normal.      Breath sounds: Normal breath sounds. Musculoskeletal:         General: No tenderness. Normal range of motion. Cervical back: Normal range of motion. Skin:     General: Skin is warm and dry. Findings: No rash. Neurological:      Mental Status: She is alert and oriented to person, place, and time. Deep Tendon Reflexes: Reflexes are normal and symmetric. Wt Readings from Last 3 Encounters:   09/28/22 205 lb (93 kg)   09/12/22 202 lb (91.6 kg)   08/31/22 203 lb (92.1 kg)     BP Readings from Last 3 Encounters:   09/28/22 110/68   09/12/22 110/70   08/31/22 132/72     Assessment/Plan:  1. Shortness of breath    2. Fatigue, unspecified type    3. Palpitations  - Holter Monitor 24 Hour; Future    4. Post-COVID chronic fatigue    5. Anxiety  - LORazepam (ATIVAN) 0.5 MG tablet; Take 1 tablet by mouth daily as needed for Anxiety for up to 30 days. Dispense: 20 tablet; Refill: 0    Symptoms include fatigue and shortness of breath.   She also notes palpitations that are somewhat new for her. Due to this we will check a Holter monitor and she already has an echocardiogram that has been ordered. Her symptoms may very well be from post-COVID fatigue. There may also be an anxiety component as it does seem to improve with Ativan. Short-term prescription refilled at this time after reviewing controlled substance nature of the medication and potential side effects, dependency, etc.    Will write for 4 more weeks of disability for post-COVID chronic fatigue. If symptoms or not improving at that point will refer to specialty services    Return in about 4 weeks (around 10/26/2022) for fatigue follow up.

## 2022-09-28 NOTE — PROGRESS NOTES
Monitor placed by [employer] of monitor 24 hour  Monitor ordered by Dr John Escalante  Serial number [id number]  Kit ID   Activation successful prior to pt leaving office?  Yes    Ordered for Palpitations

## 2022-09-28 NOTE — TELEPHONE ENCOUNTER
Monitor placed by [employer] of monitor 24 hour  Monitor ordered by Dr Aba Sibley  Serial number [id number]  Kit ID   Activation successful prior to pt leaving office?  Yes    Ordered for Palpitations

## 2022-09-29 ENCOUNTER — NURSE ONLY (OUTPATIENT)
Dept: CARDIOLOGY CLINIC | Age: 57
End: 2022-09-29

## 2022-09-29 DIAGNOSIS — R00.2 PALPITATIONS: Primary | ICD-10-CM

## 2022-09-29 PROCEDURE — 93225 XTRNL ECG REC<48 HRS REC: CPT | Performed by: INTERNAL MEDICINE

## 2022-09-30 ENCOUNTER — TELEPHONE (OUTPATIENT)
Dept: PULMONOLOGY | Age: 57
End: 2022-09-30

## 2022-09-30 NOTE — PROGRESS NOTES
Pt stopped in the office. Patch fell off. Patch has been replaced. Patch was placed horizontally due to indentation in breast/sternum area.
No significant past surgical history

## 2022-09-30 NOTE — TELEPHONE ENCOUNTER
Severo Halo RN with [facility] for short term disability  -needs OV notes from most recent OV  -chest ct  -echocardiogram  Fax to [phone]  Put claim # on the fax [id number]    Thank you!

## 2022-10-11 ENCOUNTER — TELEPHONE (OUTPATIENT)
Dept: FAMILY MEDICINE CLINIC | Age: 57
End: 2022-10-11

## 2022-10-11 PROCEDURE — 93227 XTRNL ECG REC<48 HR R&I: CPT | Performed by: INTERNAL MEDICINE

## 2022-10-11 NOTE — TELEPHONE ENCOUNTER
Patient is calling in to see if we received her FMLA Paperwork from [facility]. I didn't see anything in her chart.     Please Advise

## 2022-10-13 ENCOUNTER — TELEPHONE (OUTPATIENT)
Dept: CARDIOLOGY CLINIC | Age: 57
End: 2022-10-13

## 2022-10-13 DIAGNOSIS — R00.2 PALPITATIONS: Primary | ICD-10-CM

## 2022-10-21 ENCOUNTER — TELEPHONE (OUTPATIENT)
Dept: FAMILY MEDICINE CLINIC | Age: 57
End: 2022-10-21

## 2022-10-21 NOTE — TELEPHONE ENCOUNTER
Please inform [employer] that her Holter monitor results show normal heart rhythm activity which is good news.   If not improving would recommend follow up again with us or her lung doctor

## 2022-10-26 ENCOUNTER — TELEPHONE (OUTPATIENT)
Dept: FAMILY MEDICINE CLINIC | Age: 57
End: 2022-10-26

## 2022-10-26 ENCOUNTER — OFFICE VISIT (OUTPATIENT)
Dept: FAMILY MEDICINE CLINIC | Age: 57
End: 2022-10-26
Payer: COMMERCIAL

## 2022-10-26 VITALS
SYSTOLIC BLOOD PRESSURE: 118 MMHG | HEIGHT: 60 IN | DIASTOLIC BLOOD PRESSURE: 60 MMHG | HEART RATE: 75 BPM | WEIGHT: 198 LBS | RESPIRATION RATE: 16 BRPM | OXYGEN SATURATION: 97 % | BODY MASS INDEX: 38.87 KG/M2

## 2022-10-26 DIAGNOSIS — U09.9 POST-COVID CHRONIC FATIGUE: ICD-10-CM

## 2022-10-26 DIAGNOSIS — R10.11 RUQ ABDOMINAL PAIN: ICD-10-CM

## 2022-10-26 DIAGNOSIS — G93.32 POST-COVID CHRONIC FATIGUE: ICD-10-CM

## 2022-10-26 DIAGNOSIS — R14.0 ABDOMINAL BLOATING: ICD-10-CM

## 2022-10-26 DIAGNOSIS — F43.21 GRIEF: Primary | ICD-10-CM

## 2022-10-26 DIAGNOSIS — G47.9 SLEEP DISTURBANCE: ICD-10-CM

## 2022-10-26 LAB
A/G RATIO: 1.4 (ref 1.1–2.2)
ALBUMIN SERPL-MCNC: 4.8 G/DL (ref 3.4–5)
ALP BLD-CCNC: 71 U/L (ref 40–129)
ALT SERPL-CCNC: 23 U/L (ref 10–40)
ANION GAP SERPL CALCULATED.3IONS-SCNC: 16 MMOL/L (ref 3–16)
AST SERPL-CCNC: 27 U/L (ref 15–37)
BILIRUB SERPL-MCNC: 0.4 MG/DL (ref 0–1)
BUN BLDV-MCNC: 14 MG/DL (ref 7–20)
CALCIUM SERPL-MCNC: 10.2 MG/DL (ref 8.3–10.6)
CHLORIDE BLD-SCNC: 104 MMOL/L (ref 99–110)
CO2: 23 MMOL/L (ref 21–32)
CREAT SERPL-MCNC: 1.1 MG/DL (ref 0.6–1.1)
GFR SERPL CREATININE-BSD FRML MDRD: 58 ML/MIN/{1.73_M2}
GLUCOSE BLD-MCNC: 100 MG/DL (ref 70–99)
LIPASE: 41 U/L (ref 13–60)
POTASSIUM SERPL-SCNC: 4.2 MMOL/L (ref 3.5–5.1)
SODIUM BLD-SCNC: 143 MMOL/L (ref 136–145)
TOTAL PROTEIN: 8.2 G/DL (ref 6.4–8.2)

## 2022-10-26 PROCEDURE — G8484 FLU IMMUNIZE NO ADMIN: HCPCS | Performed by: FAMILY MEDICINE

## 2022-10-26 PROCEDURE — G8427 DOCREV CUR MEDS BY ELIG CLIN: HCPCS | Performed by: FAMILY MEDICINE

## 2022-10-26 PROCEDURE — G8417 CALC BMI ABV UP PARAM F/U: HCPCS | Performed by: FAMILY MEDICINE

## 2022-10-26 PROCEDURE — 1036F TOBACCO NON-USER: CPT | Performed by: FAMILY MEDICINE

## 2022-10-26 PROCEDURE — 3017F COLORECTAL CA SCREEN DOC REV: CPT | Performed by: FAMILY MEDICINE

## 2022-10-26 PROCEDURE — 99214 OFFICE O/P EST MOD 30 MIN: CPT | Performed by: FAMILY MEDICINE

## 2022-10-26 RX ORDER — RANITIDINE 150 MG/1
150 TABLET ORAL NIGHTLY
Qty: 30 TABLET | Refills: 2 | Status: SHIPPED | OUTPATIENT
Start: 2022-10-26 | End: 2022-10-26

## 2022-10-26 RX ORDER — FAMOTIDINE 40 MG/1
40 TABLET, FILM COATED ORAL EVERY EVENING
Qty: 30 TABLET | Refills: 3 | Status: SHIPPED | OUTPATIENT
Start: 2022-10-26

## 2022-10-26 NOTE — PROGRESS NOTES
10/26/2022    This is a 59 y.o. female   Chief Complaint   Patient presents with    Fatigue     Patient is here for a follow up      HPI    Here for follow up    Her   just 3 days ago. She is having a hard time sleeping, feeling fatigued, trouble eating. She reports her breathing had been doing better. With more recent stress it has been more challenging. She notes for the past few weeks, that when she eats she feels bloated and uncomfortable. No change in BMs. No constipation. Goes twice per day, soft stools. No blood in stools. Not much gas or belching.  - denies significant reflux symptoms.  - hasn't noticed particular foods that tend to it, any food seems to  - no med changes. Has been taking same vitamins for a long time  - notes pain will sometimes localize to her RUQ    Post-COVID fatigue  -This is been present for months. It is not significantly improved. It is impacting her ability to work and function. Review of Systems     Current Outpatient Medications   Medication Sig Dispense Refill    raNITIdine (ZANTAC) 150 MG tablet Take 1 tablet by mouth nightly 30 tablet 2    LORazepam (ATIVAN) 0.5 MG tablet Take 1 tablet by mouth daily as needed for Anxiety for up to 30 days.  20 tablet 0    glycopyrrolate-formoterol (BEVESPI AEROSPHERE) 9-4.8 MCG/ACT AERO Inhale 2 puffs into the lungs 2 times daily 1 each 11    Budeson-Glycopyrrol-Formoterol (BREZTRI AEROSPHERE) 160-9-4.8 MCG/ACT AERO Inhale 2 puffs into the lungs in the morning and at bedtime 1 each 0    Budeson-Glycopyrrol-Formoterol (BREZTRI AEROSPHERE) 160-9-4.8 MCG/ACT AERO Inhale 2 puffs into the lungs in the morning and at bedtime 1 each 11    albuterol sulfate HFA (PROVENTIL;VENTOLIN;PROAIR) 108 (90 Base) MCG/ACT inhaler INHALE 2 PUFFS BY MOUTH EVERY 6 HOURS AS NEEDED FOR SHORTNESS OF BREATH FOR WHEEZING 9 g 11    Budeson-Glycopyrrol-Formoterol (BREZTRI AEROSPHERE) 160-9-4.8 MCG/ACT AERO Inhale 2 puffs into the lungs 2 times daily 1 each 1    Multiple Vitamins-Minerals (THERAPEUTIC MULTIVITAMIN-MINERALS) tablet Take 1 tablet by mouth daily      ferrous sulfate 325 (65 Fe) MG tablet Take 325 mg by mouth daily (with breakfast)      vitamin D (CHOLECALCIFEROL) 1000 UNIT TABS tablet Take 1,000 Units by mouth daily      fluticasone (FLONASE) 50 MCG/ACT nasal spray 2 sprays by Nasal route daily 1 Bottle 0     No current facility-administered medications for this visit. /60 (Site: Right Upper Arm, Position: Sitting, Cuff Size: Large Adult)   Pulse 75   Resp 16   Ht 5' (1.524 m)   Wt 198 lb (89.8 kg)   LMP 01/28/2010   SpO2 97%   BMI 38.67 kg/m²     Physical Exam  Constitutional:       Appearance: Normal appearance. HENT:      Head: Normocephalic and atraumatic. Abdominal:      Tenderness: There is no rebound. Comments: Mild abdominal fullness  Focal RUQ TTP with +Schrader's sign   Neurological:      Mental Status: She is alert. Wt Readings from Last 3 Encounters:   10/26/22 198 lb (89.8 kg)   09/28/22 205 lb (93 kg)   09/12/22 202 lb (91.6 kg)     BP Readings from Last 3 Encounters:   10/26/22 118/60   09/28/22 110/68   09/12/22 110/70     Assessment/Plan:  1. Grief  She lost her  earlier this week. Expressed condolences, supportive care    2. Sleep disturbance  Due to #1 above. 3. Abdominal bloating  Start H2 blocker. Given presence of right upper quadrant pain check gallbladder ultrasound, labs  - raNITIdine (ZANTAC) 150 MG tablet; Take 1 tablet by mouth nightly  Dispense: 30 tablet; Refill: 2  - Comprehensive Metabolic Panel; Future  - Lipase; Future  - US GALLBLADDER RUQ; Future    4. RUQ abdominal pain  As above  - Comprehensive Metabolic Panel; Future  - Lipase; Future  - US GALLBLADDER RUQ; Future    5. Post-COVID fatigue  Chronic and persistent. It is challenging as now with the loss of her  or other significant contributors. We will extend her [place] paperwork.   If it is not improved at that time of expiration will consider referral to [facility] for assessment

## 2022-10-26 NOTE — TELEPHONE ENCOUNTER
Pharm calling to notify office ranitidine has been taken off the market. Would like to know what PCP would like to do.     Please Advise

## 2022-11-03 DIAGNOSIS — F41.9 ANXIETY: ICD-10-CM

## 2022-11-03 RX ORDER — LORAZEPAM 0.5 MG/1
0.5 TABLET ORAL DAILY PRN
Qty: 20 TABLET | Refills: 0 | Status: SHIPPED | OUTPATIENT
Start: 2022-11-03 | End: 2022-12-03

## 2022-11-04 RX ORDER — LORAZEPAM 0.5 MG/1
TABLET ORAL
Qty: 20 TABLET | Refills: 0 | OUTPATIENT
Start: 2022-11-04

## 2022-11-07 ENCOUNTER — TELEPHONE (OUTPATIENT)
Dept: FAMILY MEDICINE CLINIC | Age: 57
End: 2022-11-07

## 2022-11-07 ENCOUNTER — HOSPITAL ENCOUNTER (OUTPATIENT)
Dept: ULTRASOUND IMAGING | Age: 57
Discharge: HOME | End: 2022-11-07
Payer: COMMERCIAL

## 2022-11-07 DIAGNOSIS — R14.0 ABDOMINAL BLOATING: ICD-10-CM

## 2022-11-07 DIAGNOSIS — R10.11 RUQ ABDOMINAL PAIN: ICD-10-CM

## 2022-11-07 PROCEDURE — 76705 ECHO EXAM OF ABDOMEN: CPT

## 2022-11-21 ENCOUNTER — OFFICE VISIT (OUTPATIENT)
Dept: FAMILY MEDICINE CLINIC | Age: 57
End: 2022-11-21
Payer: COMMERCIAL

## 2022-11-21 VITALS
OXYGEN SATURATION: 97 % | HEART RATE: 64 BPM | BODY MASS INDEX: 38.47 KG/M2 | SYSTOLIC BLOOD PRESSURE: 110 MMHG | RESPIRATION RATE: 12 BRPM | WEIGHT: 197 LBS | DIASTOLIC BLOOD PRESSURE: 70 MMHG

## 2022-11-21 DIAGNOSIS — G93.32 POST-COVID CHRONIC FATIGUE: ICD-10-CM

## 2022-11-21 DIAGNOSIS — F43.21 GRIEF: Primary | ICD-10-CM

## 2022-11-21 DIAGNOSIS — F41.9 ANXIETY: ICD-10-CM

## 2022-11-21 DIAGNOSIS — G47.9 SLEEP DISTURBANCE: ICD-10-CM

## 2022-11-21 DIAGNOSIS — U09.9 POST-COVID CHRONIC FATIGUE: ICD-10-CM

## 2022-11-21 PROCEDURE — G8417 CALC BMI ABV UP PARAM F/U: HCPCS | Performed by: FAMILY MEDICINE

## 2022-11-21 PROCEDURE — G8427 DOCREV CUR MEDS BY ELIG CLIN: HCPCS | Performed by: FAMILY MEDICINE

## 2022-11-21 PROCEDURE — 3017F COLORECTAL CA SCREEN DOC REV: CPT | Performed by: FAMILY MEDICINE

## 2022-11-21 PROCEDURE — 99213 OFFICE O/P EST LOW 20 MIN: CPT | Performed by: FAMILY MEDICINE

## 2022-11-21 PROCEDURE — G8484 FLU IMMUNIZE NO ADMIN: HCPCS | Performed by: FAMILY MEDICINE

## 2022-11-21 PROCEDURE — 1036F TOBACCO NON-USER: CPT | Performed by: FAMILY MEDICINE

## 2022-11-21 RX ORDER — TRAZODONE HYDROCHLORIDE 50 MG/1
50 TABLET ORAL NIGHTLY PRN
Qty: 30 TABLET | Refills: 5 | Status: SHIPPED | OUTPATIENT
Start: 2022-11-21

## 2022-11-21 NOTE — PROGRESS NOTES
11/21/2022    This is a 59 y.o. female   Chief Complaint   Patient presents with    Post-COVID Symptoms     HPI    Here for follow up    She had been struggling with post-COVID symptoms of fatigue and shortness of breath. The shortness of breath has improved, but fatigue is still present. While dealing with this, she lost her  a month ago. Struggling with bouts of crying, sleep challenges for which she is taking ativan. - has 2 sons that live close by that she is able to grieve with  - is hoping to move out within the next couple of months to a new place  - reports anxiety has been more significant than in the past    Has been off work 5 months total since having COVID plus the loss of her . She feels her energy has improved enough to go back to work. Works in [address], mostly working at her desk    Review of Systems     Current Outpatient Medications   Medication Sig Dispense Refill    traZODone (DESYREL) 50 MG tablet Take 1 tablet by mouth nightly as needed for Sleep 30 tablet 5    LORazepam (ATIVAN) 0.5 MG tablet Take 1 tablet by mouth daily as needed for Anxiety for up to 30 days.  20 tablet 0    famotidine (PEPCID) 40 MG tablet Take 1 tablet by mouth every evening 30 tablet 3    albuterol sulfate HFA (PROVENTIL;VENTOLIN;PROAIR) 108 (90 Base) MCG/ACT inhaler INHALE 2 PUFFS BY MOUTH EVERY 6 HOURS AS NEEDED FOR SHORTNESS OF BREATH FOR WHEEZING 9 g 11    Multiple Vitamins-Minerals (THERAPEUTIC MULTIVITAMIN-MINERALS) tablet Take 1 tablet by mouth daily (Patient not taking: Reported on 11/21/2022)      ferrous sulfate 325 (65 Fe) MG tablet Take 325 mg by mouth daily (with breakfast) (Patient not taking: Reported on 11/21/2022)      vitamin D (CHOLECALCIFEROL) 1000 UNIT TABS tablet Take 1,000 Units by mouth daily (Patient not taking: Reported on 11/21/2022)      fluticasone (FLONASE) 50 MCG/ACT nasal spray 2 sprays by Nasal route daily (Patient not taking: Reported on 11/21/2022) 1 Bottle 0 No current facility-administered medications for this visit. /70 (Site: Right Upper Arm, Position: Sitting, Cuff Size: Large Adult)   Pulse 64   Resp 12   Wt 197 lb (89.4 kg)   LMP 01/28/2010   SpO2 97%   BMI 38.47 kg/m²     Physical Exam    Wt Readings from Last 3 Encounters:   11/21/22 197 lb (89.4 kg)   10/26/22 198 lb (89.8 kg)   09/28/22 205 lb (93 kg)     BP Readings from Last 3 Encounters:   11/21/22 110/70   10/26/22 118/60   09/28/22 110/68       Assessment/Plan:  1. Grief    2. Anxiety    3. Post-COVID chronic fatigue    4. Sleep disturbance  - traZODone (DESYREL) 50 MG tablet; Take 1 tablet by mouth nightly as needed for Sleep  Dispense: 30 tablet; Refill: 5    Juliana Garrison has post-COVID chronic fatigue complicated by recent loss of her  and grief. We discussed no longer using lorazepam but switching to trazodone as needed for sleep. Since her symptoms of fatigue after infection are improving, we will write for her to return to work on Monday.

## 2022-11-28 ENCOUNTER — TELEPHONE (OUTPATIENT)
Dept: FAMILY MEDICINE CLINIC | Age: 57
End: 2022-11-28

## 2022-11-28 NOTE — TELEPHONE ENCOUNTER
Pt called in wants a note stating she is cleared to be back to work today     Please email [email or web address]   Please advise once complete

## 2022-11-28 NOTE — LETTER
[address] [facility] [address] [facility]  Phone: [phone]  Fax: [phone]    Alayna Cabrera MD        November 28, 2022     Patient: Delon Dennis   YOB: 1965   Date of Visit: 11/28/2022       To Whom It May Concern: It is my medical opinion that Atiya Richter may return to work on 11/28/2022. .    If you have any questions or concerns, please don't hesitate to call.     Sincerely,        Alayna Cabrera MD

## 2022-12-13 ENCOUNTER — COMMUNITY OUTREACH (OUTPATIENT)
Dept: FAMILY MEDICINE CLINIC | Age: 57
End: 2022-12-13

## 2022-12-13 NOTE — PROGRESS NOTES
Patient's HM shows they are overdue for Mammogram and Cervical Cancer Screening. Care Everywhere and  files searched. No results to attach to order nor HM updated.

## 2022-12-16 ENCOUNTER — OFFICE VISIT (OUTPATIENT)
Dept: PULMONOLOGY | Age: 57
End: 2022-12-16
Payer: COMMERCIAL

## 2022-12-16 VITALS
TEMPERATURE: 96.4 F | OXYGEN SATURATION: 97 % | SYSTOLIC BLOOD PRESSURE: 112 MMHG | RESPIRATION RATE: 16 BRPM | BODY MASS INDEX: 38.09 KG/M2 | DIASTOLIC BLOOD PRESSURE: 70 MMHG | HEIGHT: 60 IN | WEIGHT: 194 LBS | HEART RATE: 64 BPM

## 2022-12-16 DIAGNOSIS — J41.0 SIMPLE CHRONIC BRONCHITIS (HCC): ICD-10-CM

## 2022-12-16 DIAGNOSIS — E66.9 OBESITY (BMI 30-39.9): ICD-10-CM

## 2022-12-16 PROCEDURE — G8417 CALC BMI ABV UP PARAM F/U: HCPCS | Performed by: INTERNAL MEDICINE

## 2022-12-16 PROCEDURE — 3017F COLORECTAL CA SCREEN DOC REV: CPT | Performed by: INTERNAL MEDICINE

## 2022-12-16 PROCEDURE — 99214 OFFICE O/P EST MOD 30 MIN: CPT | Performed by: INTERNAL MEDICINE

## 2022-12-16 PROCEDURE — 1036F TOBACCO NON-USER: CPT | Performed by: INTERNAL MEDICINE

## 2022-12-16 PROCEDURE — G8427 DOCREV CUR MEDS BY ELIG CLIN: HCPCS | Performed by: INTERNAL MEDICINE

## 2022-12-16 PROCEDURE — 3023F SPIROM DOC REV: CPT | Performed by: INTERNAL MEDICINE

## 2022-12-16 PROCEDURE — G8484 FLU IMMUNIZE NO ADMIN: HCPCS | Performed by: INTERNAL MEDICINE

## 2022-12-16 NOTE — PROGRESS NOTES
REASON FOR CONSULTATION/CC:   Chief Complaint   Patient presents with    Shortness of Breath    Follow-up        Consult at request of  Dolly Urrutia MD     PCP: Dolly Urrutia MD    HISTORY OF PRESENT ILLNESS: Feliciano Chicas is a 59y.o. year old female with a history of Smoking, brain injury , complicated by pneumonia and tracheostomy who presents        History  Patient has a history of an abnormal radiograph with a history of severe pneumonia approximately year 2000 leading to tracheostomy. His CT with tree-in-bud opacity but bronchoscopy in 2018 was negative for ALLISON. Pathology was negative. ABRAHAN and double-stranded DNA was positive with normal sed rate CRP. Ultimately, she did not respond to steroids and the abnormal radiograph was thought to be secondary to her prior severe pneumonia. She had covid in July 2022 with persistent shortness of breath after this point. History       chronic bronchitis  Bevespi   Back to work. follow up CT chest .     Sept 2021. Stable from 2020      Weight                  Objective:   PHYSICAL EXAM:  Blood pressure 112/70, pulse 64, temperature (!) 96.4 °F (35.8 °C), temperature source Infrared, resp. rate 16, height 5' (1.524 m), weight 194 lb (88 kg), last menstrual period 01/28/2010, SpO2 97 %, not currently breastfeeding.'  Gen: No distress. ENT: Well healed trach scar  Resp: No accessory muscle use. No crackles. No wheezes. No rhonchi. CV: Regular rate. Regular rhythm. No murmur or rub. No edema. Skin: Warm, dry, normal texture and turgor. No nodule on exposed extremities. M/S: No cyanosis. No clubbing. No joint deformity. Psych: Oriented x 3. No anxiety. Awake. Alert. Intact judgement and insight. Good Mood / Affect. Memory appears in tact      Data Reviewed:       Assessment:       Abnormal radiograph  Snoring with sleep apnea. Not using CPAP.   Hypersomnia  Restrictive lung disease  Simple chronic bronchitis      Plan:           Problem List Items Addressed This Visit       Simple chronic bronchitis ([address]      Controlled with Bevespi. Obesity (BMI 30-39. 9)      Uncontrolled. This note was transcribed using [id number] "[employer] Please disregard any translational errors.

## 2023-05-31 ENCOUNTER — OFFICE VISIT (OUTPATIENT)
Dept: PULMONOLOGY | Age: 58
End: 2023-05-31
Payer: COMMERCIAL

## 2023-05-31 VITALS
DIASTOLIC BLOOD PRESSURE: 70 MMHG | HEIGHT: 60 IN | SYSTOLIC BLOOD PRESSURE: 120 MMHG | OXYGEN SATURATION: 97 % | TEMPERATURE: 97.8 F | HEART RATE: 60 BPM | RESPIRATION RATE: 18 BRPM | BODY MASS INDEX: 36.71 KG/M2 | WEIGHT: 187 LBS

## 2023-05-31 DIAGNOSIS — J98.4 RESTRICTIVE LUNG DISEASE: ICD-10-CM

## 2023-05-31 DIAGNOSIS — E66.9 OBESITY (BMI 30-39.9): ICD-10-CM

## 2023-05-31 PROCEDURE — 3017F COLORECTAL CA SCREEN DOC REV: CPT | Performed by: INTERNAL MEDICINE

## 2023-05-31 PROCEDURE — G8427 DOCREV CUR MEDS BY ELIG CLIN: HCPCS | Performed by: INTERNAL MEDICINE

## 2023-05-31 PROCEDURE — G8417 CALC BMI ABV UP PARAM F/U: HCPCS | Performed by: INTERNAL MEDICINE

## 2023-05-31 PROCEDURE — 1036F TOBACCO NON-USER: CPT | Performed by: INTERNAL MEDICINE

## 2023-05-31 PROCEDURE — 99212 OFFICE O/P EST SF 10 MIN: CPT | Performed by: INTERNAL MEDICINE

## 2023-05-31 NOTE — PROGRESS NOTES
REASON FOR CONSULTATION/CC:   Chief Complaint   Patient presents with    Shortness of Breath     ILD        Consult at request of  Avery Albert MD     PCP: Avery Albert MD    HISTORY OF PRESENT ILLNESS: Lucia Rodriguez is a 59y.o. year old female with a history of Smoking, brain injury , complicated by pneumonia and tracheostomy who presents        History  Patient has a history of an abnormal radiograph with a history of severe pneumonia approximately year 2000 leading to tracheostomy. His CT with tree-in-bud opacity but bronchoscopy in 2018 was negative for ALLISON. Pathology was negative. ABRAHAN and double-stranded DNA was positive with normal sed rate CRP. Ultimately, she did not respond to steroids and the abnormal radiograph was thought to be secondary to her prior severe pneumonia. She had covid in July 2022 with persistent shortness of breath after this point. History       chronic bronchitis  Off [employer]. Recent at [address] [place] [address] and able to climb mountain. Objective:   PHYSICAL EXAM:  Blood pressure 120/70, pulse 60, temperature 97.8 °F (36.6 °C), temperature source Infrared, resp. rate 18, height 5' (1.524 m), weight 187 lb (84.8 kg), last menstrual period 01/28/2010, SpO2 97 %, not currently breastfeeding.'  Gen: No distress. ENT: Well healed trach scar  Resp: No accessory muscle use. No crackles. No wheezes. No rhonchi. CV: Regular rate. Regular rhythm. No murmur or rub. No edema. Skin: Warm, dry, normal texture and turgor. No nodule on exposed extremities. M/S: No cyanosis. No clubbing. No joint deformity. Psych: Oriented x 3. No anxiety. Awake. Alert. Intact judgement and insight. Good Mood / Affect. Memory appears in tact      Data Reviewed:       Assessment:       Abnormal radiograph  Snoring with sleep apnea. Not using CPAP.   Hypersomnia  Restrictive lung disease  Simple chronic bronchitis      Plan:           Problem List Items Addressed This Visit

## 2023-06-01 NOTE — ASSESSMENT & PLAN NOTE
Significant improved. She is now off all medications. Exercising routinely. Was [place]. Therefore, patient will follow-up as needed.

## 2023-06-29 DIAGNOSIS — Z12.31 SCREENING MAMMOGRAM FOR BREAST CANCER: Primary | ICD-10-CM

## 2023-09-25 ENCOUNTER — OFFICE VISIT (OUTPATIENT)
Dept: FAMILY MEDICINE CLINIC | Age: 58
End: 2023-09-25
Payer: COMMERCIAL

## 2023-09-25 VITALS
OXYGEN SATURATION: 96 % | TEMPERATURE: 97.6 F | HEART RATE: 55 BPM | WEIGHT: 165 LBS | RESPIRATION RATE: 16 BRPM | HEIGHT: 60 IN | BODY MASS INDEX: 32.39 KG/M2

## 2023-09-25 DIAGNOSIS — J20.9 ACUTE BRONCHITIS, UNSPECIFIED ORGANISM: Primary | ICD-10-CM

## 2023-09-25 PROCEDURE — 99213 OFFICE O/P EST LOW 20 MIN: CPT | Performed by: FAMILY MEDICINE

## 2023-09-25 PROCEDURE — G8417 CALC BMI ABV UP PARAM F/U: HCPCS | Performed by: FAMILY MEDICINE

## 2023-09-25 PROCEDURE — G8427 DOCREV CUR MEDS BY ELIG CLIN: HCPCS | Performed by: FAMILY MEDICINE

## 2023-09-25 PROCEDURE — 1036F TOBACCO NON-USER: CPT | Performed by: FAMILY MEDICINE

## 2023-09-25 PROCEDURE — 3017F COLORECTAL CA SCREEN DOC REV: CPT | Performed by: FAMILY MEDICINE

## 2023-09-25 RX ORDER — AZITHROMYCIN 250 MG/1
TABLET, FILM COATED ORAL
Qty: 6 TABLET | Refills: 0 | Status: SHIPPED | OUTPATIENT
Start: 2023-09-25

## 2023-09-25 RX ORDER — PREDNISONE 10 MG/1
TABLET ORAL
Qty: 20 TABLET | Refills: 0 | Status: SHIPPED | OUTPATIENT
Start: 2023-09-25

## 2023-09-25 SDOH — ECONOMIC STABILITY: FOOD INSECURITY: WITHIN THE PAST 12 MONTHS, YOU WORRIED THAT YOUR FOOD WOULD RUN OUT BEFORE YOU GOT MONEY TO BUY MORE.: NEVER TRUE

## 2023-09-25 SDOH — ECONOMIC STABILITY: FOOD INSECURITY: WITHIN THE PAST 12 MONTHS, THE FOOD YOU BOUGHT JUST DIDN'T LAST AND YOU DIDN'T HAVE MONEY TO GET MORE.: NEVER TRUE

## 2023-09-25 SDOH — ECONOMIC STABILITY: HOUSING INSECURITY
IN THE LAST 12 MONTHS, WAS THERE A TIME WHEN YOU DID NOT HAVE A STEADY PLACE TO SLEEP OR SLEPT IN A SHELTER (INCLUDING NOW)?: NO

## 2023-09-25 SDOH — ECONOMIC STABILITY: INCOME INSECURITY: HOW HARD IS IT FOR YOU TO PAY FOR THE VERY BASICS LIKE FOOD, HOUSING, MEDICAL CARE, AND HEATING?: NOT HARD AT ALL

## 2023-09-25 ASSESSMENT — PATIENT HEALTH QUESTIONNAIRE - PHQ9
2. FEELING DOWN, DEPRESSED OR HOPELESS: 0
SUM OF ALL RESPONSES TO PHQ QUESTIONS 1-9: 0
SUM OF ALL RESPONSES TO PHQ9 QUESTIONS 1 & 2: 0
SUM OF ALL RESPONSES TO PHQ QUESTIONS 1-9: 0
1. LITTLE INTEREST OR PLEASURE IN DOING THINGS: 0
SUM OF ALL RESPONSES TO PHQ QUESTIONS 1-9: 0
SUM OF ALL RESPONSES TO PHQ QUESTIONS 1-9: 0

## 2023-09-25 NOTE — PROGRESS NOTES
9/25/2023    This is a 59 y.o. female   Chief Complaint   Patient presents with    Cough     Pt has had a cough body aches and fatigue been going on x 2 weeks. HPI    Here for concerns for not feeling well  - symptoms present for 2 weeks  - cough productive of green sputum. Feels tired. Not significantly short of breath. - had fevers at first that have since cleared up  - notes body aches  - has known hx of lung disease (restrictive / chronic bronchitis)  - sore throat that has resolved  - no ear pain    Review of Systems     Current Outpatient Medications   Medication Sig Dispense Refill    azithromycin (ZITHROMAX) 250 MG tablet Take 2 tabs by mouth once on day 1, followed by 1 tab by mouth daily for four days 6 tablet 0    predniSONE (DELTASONE) 10 MG tablet Take 4 tabs by mouth daily for 2 days, 3 tabs for 2 days, 2 tabs for 2 days, 1 tab for 2 days 20 tablet 0    traZODone (DESYREL) 50 MG tablet Take 1 tablet by mouth nightly as needed for Sleep (Patient not taking: Reported on 5/31/2023) 30 tablet 5    famotidine (PEPCID) 40 MG tablet Take 1 tablet by mouth every evening (Patient not taking: Reported on 5/31/2023) 30 tablet 3    albuterol sulfate HFA (PROVENTIL;VENTOLIN;PROAIR) 108 (90 Base) MCG/ACT inhaler INHALE 2 PUFFS BY MOUTH EVERY 6 HOURS AS NEEDED FOR SHORTNESS OF BREATH FOR WHEEZING 9 g 11    Multiple Vitamins-Minerals (THERAPEUTIC MULTIVITAMIN-MINERALS) tablet Take 1 tablet by mouth daily (Patient not taking: Reported on 11/21/2022)      ferrous sulfate 325 (65 Fe) MG tablet Take 325 mg by mouth daily (with breakfast) (Patient not taking: Reported on 11/21/2022)      vitamin D (CHOLECALCIFEROL) 1000 UNIT TABS tablet Take 1,000 Units by mouth daily (Patient not taking: Reported on 11/21/2022)      fluticasone (FLONASE) 50 MCG/ACT nasal spray 2 sprays by Nasal route daily (Patient not taking: Reported on 11/21/2022) 1 Bottle 0     No current facility-administered medications for this visit.

## 2025-05-21 ENCOUNTER — OFFICE VISIT (OUTPATIENT)
Dept: FAMILY MEDICINE CLINIC | Age: 60
End: 2025-05-21
Payer: COMMERCIAL

## 2025-05-21 VITALS
RESPIRATION RATE: 20 BRPM | HEART RATE: 74 BPM | TEMPERATURE: 97.6 F | SYSTOLIC BLOOD PRESSURE: 103 MMHG | OXYGEN SATURATION: 97 % | WEIGHT: 184.6 LBS | BODY MASS INDEX: 36.24 KG/M2 | DIASTOLIC BLOOD PRESSURE: 68 MMHG | HEIGHT: 60 IN

## 2025-05-21 DIAGNOSIS — J41.0 SIMPLE CHRONIC BRONCHITIS (HCC): ICD-10-CM

## 2025-05-21 DIAGNOSIS — R05.1 ACUTE COUGH: Primary | ICD-10-CM

## 2025-05-21 LAB
Lab: NORMAL
PERFORMING INSTRUMENT: NORMAL
QC PASS/FAIL: NORMAL
SARS-COV-2, POC: NORMAL

## 2025-05-21 PROCEDURE — G8427 DOCREV CUR MEDS BY ELIG CLIN: HCPCS | Performed by: FAMILY MEDICINE

## 2025-05-21 PROCEDURE — 87426 SARSCOV CORONAVIRUS AG IA: CPT | Performed by: FAMILY MEDICINE

## 2025-05-21 PROCEDURE — 3017F COLORECTAL CA SCREEN DOC REV: CPT | Performed by: FAMILY MEDICINE

## 2025-05-21 PROCEDURE — 1036F TOBACCO NON-USER: CPT | Performed by: FAMILY MEDICINE

## 2025-05-21 PROCEDURE — G8417 CALC BMI ABV UP PARAM F/U: HCPCS | Performed by: FAMILY MEDICINE

## 2025-05-21 PROCEDURE — 99213 OFFICE O/P EST LOW 20 MIN: CPT | Performed by: FAMILY MEDICINE

## 2025-05-21 PROCEDURE — 3023F SPIROM DOC REV: CPT | Performed by: FAMILY MEDICINE

## 2025-05-21 RX ORDER — ALBUTEROL SULFATE 90 UG/1
INHALANT RESPIRATORY (INHALATION)
Qty: 9 G | Refills: 11 | Status: SHIPPED | OUTPATIENT
Start: 2025-05-21

## 2025-05-21 RX ORDER — DEXTROMETHORPHAN HYDROBROMIDE AND PROMETHAZINE HYDROCHLORIDE 15; 6.25 MG/5ML; MG/5ML
5 SYRUP ORAL NIGHTLY PRN
Qty: 118 ML | Refills: 0 | Status: SHIPPED | OUTPATIENT
Start: 2025-05-21

## 2025-05-21 RX ORDER — FLUTICASONE FUROATE, UMECLIDINIUM BROMIDE AND VILANTEROL TRIFENATATE 100; 62.5; 25 UG/1; UG/1; UG/1
1 POWDER RESPIRATORY (INHALATION) DAILY
Qty: 1 EACH | Refills: 0 | Status: CANCELLED | COMMUNITY
Start: 2025-05-21

## 2025-05-21 SDOH — ECONOMIC STABILITY: FOOD INSECURITY: WITHIN THE PAST 12 MONTHS, YOU WORRIED THAT YOUR FOOD WOULD RUN OUT BEFORE YOU GOT MONEY TO BUY MORE.: NEVER TRUE

## 2025-05-21 SDOH — ECONOMIC STABILITY: FOOD INSECURITY: WITHIN THE PAST 12 MONTHS, THE FOOD YOU BOUGHT JUST DIDN'T LAST AND YOU DIDN'T HAVE MONEY TO GET MORE.: NEVER TRUE

## 2025-05-21 ASSESSMENT — PATIENT HEALTH QUESTIONNAIRE - PHQ9
SUM OF ALL RESPONSES TO PHQ QUESTIONS 1-9: 0
SUM OF ALL RESPONSES TO PHQ QUESTIONS 1-9: 0
2. FEELING DOWN, DEPRESSED OR HOPELESS: NOT AT ALL
SUM OF ALL RESPONSES TO PHQ QUESTIONS 1-9: 0
SUM OF ALL RESPONSES TO PHQ QUESTIONS 1-9: 0
1. LITTLE INTEREST OR PLEASURE IN DOING THINGS: NOT AT ALL

## 2025-05-21 ASSESSMENT — VISUAL ACUITY: OU: 1

## 2025-05-21 NOTE — PROGRESS NOTES
Janeth Langston (:  1965) is a 60 y.o. female, here for evaluation of the following chief complaint(s):  Cough (Cough, congestion, body aches, sore throat x3 days)      1. Acute cough  - POCT COVID-19, Antigen    Acute cough d/t Acute URI without COPD exacerbation or Pneumonia  - Given timeframe and lack of constant or worsening consitutional symptoms, likely viral in origin rather than bacterial. No clinical COPD exacerbation since not dyspneic, no hypoxia and no increased sputum production. Low suspicion for pneumonia given good air movement on auscultation, no hypoxia, no rales, no rhonchi, or fevers.     - Albuterol inhaler for wheezing   - Nighttime cough medicine for symptomatic relief  - Trellegy QD until improvement of symptoms       Follow up if worsening symptoms, fevers higher than 100.4, worsening sputum production, and difficulty breathing.    Wt Readings from Last 3 Encounters:   25 83.7 kg (184 lb 9.6 oz)   23 74.8 kg (165 lb)   23 84.8 kg (187 lb)     BP Readings from Last 3 Encounters:   25 103/68   23 120/70   22 112/70     Prior to Visit Medications    Medication Sig Taking? Authorizing Provider   albuterol sulfate HFA (PROVENTIL;VENTOLIN;PROAIR) 108 (90 Base) MCG/ACT inhaler INHALE 2 PUFFS BY MOUTH EVERY 6 HOURS AS NEEDED FOR SHORTNESS OF BREATH FOR WHEEZING Yes Jethro Kirkland MD   Multiple Vitamins-Minerals (THERAPEUTIC MULTIVITAMIN-MINERALS) tablet Take 1 tablet by mouth daily Yes ProviderArleth MD     Patient Active Problem List   Diagnosis    Brain aneurysm - rupture 20 yr ago    History of hepatitis C - tx w interferon, cure    Simple chronic bronchitis (HCC)    Restrictive lung disease    Hypersomnia    Obesity (BMI 30-39.9)    AMANDA (obstructive sleep apnea)    Restless legs syndrome (RLS)    History of tracheostomy        Subjective   Sore throat started on  3D has improved since. Pt reports a constant dry cough, post-nasal

## 2025-05-23 ENCOUNTER — TELEPHONE (OUTPATIENT)
Dept: FAMILY MEDICINE CLINIC | Age: 60
End: 2025-05-23

## 2025-05-23 RX ORDER — AZITHROMYCIN 250 MG/1
TABLET, FILM COATED ORAL
Qty: 6 TABLET | Refills: 0 | Status: SHIPPED | OUTPATIENT
Start: 2025-05-23

## 2025-05-23 NOTE — TELEPHONE ENCOUNTER
Antibiotic sent due to worsening symptoms and her known lung condition    Let us know if not improving

## 2025-05-23 NOTE — TELEPHONE ENCOUNTER
LOV 5/21/2025  Pt still has a cough , yellow , body aches     Please advise   Another appointment ?  Send antibiotics ?    [facility] on [address]

## (undated) DEVICE — CONTAINER SPEC 480ML CLR POLYSTYR 10% NEUT BUFF FRMLN ZN

## (undated) DEVICE — MASK, AEROSOL, ELONGATED, ADULT: Brand: MEDLINE

## (undated) DEVICE — SINGLE USE BIOPSY VALVE MAJ-210: Brand: SINGLE USE BIOPSY VALVE (STERILE)

## (undated) DEVICE — 60 ML SYRINGE REGULAR TIP: Brand: MONOJECT

## (undated) DEVICE — FORCEPS BX L100CM JAW DIA1.8MM CHN 2MM PULM W/ NDL DISP

## (undated) DEVICE — AIRLIFE™ MISTY MAX 10™ NEBULIZER WITH 7 FOOT (2.1 M) CRUSH RESISTANT OXYGEN TUBING, BAFFLED TEE ADAPTER (22 MM I.D./22 MM O.D.), MOUTHPIECE AND 6 INCH (15 CM) FLEXTUBE: Brand: AIRLIFE™

## (undated) DEVICE — TRAP,MUCUS SPECIMEN, 80CC: Brand: MEDLINE

## (undated) DEVICE — Z INACTIVE USE 2711638 MASK SURG WHT STD PREM NONOIL HLTH CARE PARTICULATE RESP

## (undated) DEVICE — SYRINGE MED 10ML POLYPR LUERSLIP TIP FLAT TOP W/O SFTY DISP

## (undated) DEVICE — SINGLE USE SUCTION VALVE MAJ-209: Brand: SINGLE USE SUCTION VALVE (STERILE)